# Patient Record
Sex: FEMALE | Race: WHITE | NOT HISPANIC OR LATINO | Employment: OTHER | URBAN - METROPOLITAN AREA
[De-identification: names, ages, dates, MRNs, and addresses within clinical notes are randomized per-mention and may not be internally consistent; named-entity substitution may affect disease eponyms.]

---

## 2017-01-30 RX ORDER — LISINOPRIL 20 MG/1
TABLET ORAL
Qty: 60 TABLET | Refills: 6 | OUTPATIENT
Start: 2017-01-30

## 2017-04-24 PROBLEM — N93.9 ABNORMAL UTERINE BLEEDING (AUB): Status: ACTIVE | Noted: 2017-04-24

## 2017-04-24 PROBLEM — N88.2 CERVICAL STENOSIS (UTERINE CERVIX): Status: ACTIVE | Noted: 2017-04-24

## 2017-05-09 ENCOUNTER — TRANSCRIBE ORDERS (OUTPATIENT)
Dept: GYNECOLOGIC ONCOLOGY | Facility: CLINIC | Age: 62
End: 2017-05-09

## 2017-05-09 DIAGNOSIS — Z12.31 VISIT FOR SCREENING MAMMOGRAM: Primary | ICD-10-CM

## 2017-05-22 ENCOUNTER — TELEPHONE (OUTPATIENT)
Dept: NEUROSURGERY | Facility: CLINIC | Age: 62
End: 2017-05-22

## 2017-06-21 ENCOUNTER — OFFICE VISIT (OUTPATIENT)
Dept: GYNECOLOGIC ONCOLOGY | Facility: CLINIC | Age: 62
End: 2017-06-21

## 2017-06-21 ENCOUNTER — HOSPITAL ENCOUNTER (OUTPATIENT)
Dept: MAMMOGRAPHY | Facility: HOSPITAL | Age: 62
Discharge: HOME | End: 2017-06-21
Attending: OBSTETRICS & GYNECOLOGY | Admitting: OBSTETRICS & GYNECOLOGY

## 2017-06-21 VITALS
DIASTOLIC BLOOD PRESSURE: 65 MMHG | WEIGHT: 185 LBS | BODY MASS INDEX: 34.04 KG/M2 | RESPIRATION RATE: 20 BRPM | HEART RATE: 60 BPM | TEMPERATURE: 96.7 F | OXYGEN SATURATION: 95 % | SYSTOLIC BLOOD PRESSURE: 146 MMHG | HEIGHT: 62 IN

## 2017-06-21 DIAGNOSIS — Z12.31 VISIT FOR SCREENING MAMMOGRAM: ICD-10-CM

## 2017-06-21 DIAGNOSIS — E66.09 NON MORBID OBESITY DUE TO EXCESS CALORIES: ICD-10-CM

## 2017-06-21 DIAGNOSIS — N93.9 ABNORMAL UTERINE BLEEDING (AUB): ICD-10-CM

## 2017-06-21 DIAGNOSIS — Z01.419 WELL FEMALE EXAM WITH ROUTINE GYNECOLOGICAL EXAM: Primary | ICD-10-CM

## 2017-06-21 PROCEDURE — G0202 SCR MAMMO BI INCL CAD: HCPCS

## 2017-06-21 PROCEDURE — 77063 BREAST TOMOSYNTHESIS BI: CPT | Performed by: RADIOLOGY

## 2017-06-21 PROCEDURE — 99396 PREV VISIT EST AGE 40-64: CPT | Performed by: NURSE PRACTITIONER

## 2017-06-21 PROCEDURE — 77067 SCR MAMMO BI INCL CAD: CPT | Performed by: RADIOLOGY

## 2017-06-21 PROCEDURE — 77063 BREAST TOMOSYNTHESIS BI: CPT

## 2017-06-21 NOTE — PROGRESS NOTES
GYN ONCOLOGY ANNUAL WELL WOMAN VISIT      Stella Edmonds  [id number]  1955      Chief Complaint: Gynecologic Exam (here for annual exam with no complaints. )        History of present illness:  Stella Edmonds is a 62 y.o. year old female who is here today for an annual exam.  Patient has a history of fibroid uterus and abnormal bleeding and is status post hysterectomy and bilateral salpingo-nephrectomy.  She is feeling well today.  She has had no significant changes in health history her recent surgeries.  She is getting ready to retire within the next 6 months.  She is concerned about her difficulty with weight loss.  She has cut out so that has increased activity but has not been very successful with overall weight loss.  Her goal is to be able to come off some of her blood pressure medications as well as get healthier in her [facility].  Otherwise she has been feeling well.  She's had no vaginal bleeding or pelvic pain.  Her bowels and bladder are functioning well.  Her mammogram was done this morning and she has no new breast complaints today.  Her colonoscopy is up-to-date, done 2016 recommendations for 10 year follow-up.  She sees Dr. TARI Blanco for primary care for management of thyroid dysfunction and hypertension.    Cancer History:    No history exists.       Obstetric History:  OB History      Para Term  AB TAB SAB Ectopic Multiple Living    1 1 1                Menstrual History:     No LMP recorded. Patient has had a hysterectomy.          Past Medical History:   Diagnosis Date   • Abnormal EKG     History of abnormal EKG: Myocardial perfusion study, 2009:  Reduced exercise tolerance, no evidence of inducible ischemia, normal LV systolic function.    • Abnormal leg movement     Probable restless leg syndrome.   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    • Esophageal dysmotility    • Fatigue      She reports  that she is feeling less fatigued off her diuretic. Lightheadedness and dizziness; improved.   • Gastritis    • GERD (gastroesophageal reflux disease)    • Globus sensation    •  1 para 1    • Hiatal hernia    • History of hepatitis A     Age 9   • Hot flashes    • Hyperlipidemia    • Hypertension    • Hypothyroidism    • Lower extremity edema     Mild.She did state that she does have some increase in her lower extremity edema, and Dr. Willett discussed with her that she may take hydrochlorothiazide 12.5 or half a tablet on an as needed basis.   • Migraines     Migraines, treated with propranolol.   • Obesity (BMI 30.0-34.9)     BMI 33.5   • Reflux esophagitis    • Schatzki's ring    • UTI (urinary tract infection)     History of UTIs   • Varicose vein     Varicosities       Past Surgical History:   Procedure Laterality Date   • BREAST BIOPSY Left        • ESOPHAGEAL DILATATION     • HYSTERECTOMY      total    • OOPHORECTOMY     • OTHER SURGICAL HISTORY  2015    Prior right frontal craniotomy for Spetzler-Chucky grade I arteriovenous malformation.-DR. AU/DR. BUNDY   • OTHER SURGICAL HISTORY      Frontal AV malformation with removal, Dr. Au, .   • RENAL ARTERY STENT     • ROTATOR CUFF REPAIR Left     Left rotator cuff surgery.   • UPPER GASTROINTESTINAL ENDOSCOPY  2005       MEDICATIONS: The current medication list was reviewed and reconciled.     Allergies:  is allergic to codeine; lortab [hydrocodone-acetaminophen]; morphine and related; and other.    Family History   Problem Relation Age of Onset   • Breast cancer Mother 34   • Cervical cancer Other    • Ovarian cancer Paternal Grandmother 80       Health Maintenance:  Last mammogram was 2017. Last colonoscopy was 2016, with recommended follow-up in 10 year(s). Last pap smear was 2014, results were  normal PAP..    Review of Systems   Constitutional: Positive for fatigue. Negative for fever and  "unexpected weight change.   HENT: Negative for congestion, ear pain, hearing loss, sinus pressure and trouble swallowing.    Eyes: Negative for visual disturbance.   Respiratory: Negative for cough, chest tightness, shortness of breath and wheezing.    Cardiovascular: Negative for chest pain, palpitations and leg swelling.   Gastrointestinal: Negative for abdominal distention, abdominal pain, constipation, diarrhea, nausea and vomiting.   Endocrine: Negative for cold intolerance, heat intolerance, polydipsia, polyphagia and polyuria.   Genitourinary: Negative for difficulty urinating, dyspareunia, dysuria, frequency, hematuria, pelvic pain, urgency, vaginal bleeding, vaginal discharge and vaginal pain.   Musculoskeletal: Positive for arthralgias. Negative for gait problem, joint swelling and myalgias.   Skin: Negative for color change, pallor and rash.   Neurological: Negative for dizziness, seizures, syncope, weakness, light-headedness, numbness and headaches.   Hematological: Negative for adenopathy. Does not bruise/bleed easily.   Psychiatric/Behavioral: Negative for agitation, confusion, sleep disturbance and suicidal ideas. The patient is not nervous/anxious.        Physical Exam  Vital Signs: /65  Pulse 60  Temp 96.7 °F (35.9 °C) (Temporal Artery )   Resp 20  Ht 62\" (157.5 cm)  Wt 185 lb (83.9 kg)  SpO2 95%  BMI 33.84 kg/m2   General Appearance:  alert, cooperative, no apparent distress and appears stated age   Neurologic/Psychiatric: A&O x 3, gait steady, appropriate affect   HEENT:  Normocephalic, without obvious abnormality, mucous membranes moist   Neck: Supple, symmetrical, trachea midline, no adenopathy;  No thyromegaly, masses, or tenderness   Back:   Symmetric, no curvature, ROM normal, no CVA tenderness   Lungs:   Clear to auscultation bilaterally; respirations regular, even, and unlabored bilaterally   Heart:  Regular rate and rhythm, no murmurs appreciated   Breasts:  Symmetrical, no " masses, no lesions and no nipple discharge   Abdomen:   Soft, non-tender, non-distended and no organomegaly   Lymph nodes: No cervical, supraclavicular, inguinal or axillary adenopathy noted   Extremities: Normal, atraumatic; no clubbing, cyanosis, or edema    Skin: No rashes, ulcers, or suspicious lesions noted   Pelvic: External Genitalia  without lesions or skin changes  Vagina  is pale, atrophic.   Vaginal Cuff  Female Vaginal Cuff: smooth, intact, without visible lesions and pap obtained  Uterus  surgically absent  Ovaries  surgically absent bilaterallly and without palpable masses or fullness  Parametria  smooth  Rectovaginal  Female rectovaginal: confirms no masses or bleeding and Hemoccult negative     ECOG Performance Status: 1 - Symptomatic but completely ambulatory    Procedure Note:  No notes on file    Assessment and Plan:    Stella was seen today for gynecologic exam.    Diagnoses and all orders for this visit:    Well female exam with routine gynecological exam    Abnormal uterine bleeding (AUB)    Non morbid obesity due to excess calories  -     Ambulatory Referral to Nutrition Services      The patient was instructed to call with vaginal bleeding, discharge, pelvic pain, change in bowel or bladder function, or any new symptoms for evaluation of her complaints.   Mamm UTD- next due 6/21/2017  Colonoscopy UTD- next due 2026 with Dr. Jeffers  I will refer her to dietician to call her regarding recommendations for weight loss. The patient has already made some changes including cutting out all intake of polyp and increasing her exercise with walking.  She would like to become off her blood pressure medication and knows needs to lose weight to do so.  Primary care follow up with Dr. Blanco for management of hypothyroidism and hypertension.   Return in about 1 year (around 6/21/2018) for Annual Exam.      ZOHAIB Whitaker      Note: Speech recognition transcription software was used to dictate  portions of this document.  An attempt at proofreading has been made though minor errors in transcription may still be present.  Please do not hesitate to call our office with any questions.

## 2017-06-27 ENCOUNTER — HOSPITAL ENCOUNTER (OUTPATIENT)
Dept: MAMMOGRAPHY | Facility: HOSPITAL | Age: 62
Discharge: HOME | End: 2017-06-27
Admitting: OBSTETRICS & GYNECOLOGY

## 2017-06-27 ENCOUNTER — TRANSCRIBE ORDERS (OUTPATIENT)
Dept: MAMMOGRAPHY | Facility: HOSPITAL | Age: 62
End: 2017-06-27

## 2017-06-27 DIAGNOSIS — R92.8 ABNORMAL MAMMOGRAM: Primary | ICD-10-CM

## 2017-06-27 DIAGNOSIS — R92.8 ABNORMAL MAMMOGRAM: ICD-10-CM

## 2017-06-27 PROCEDURE — G0204 DX MAMMO INCL CAD BI: HCPCS

## 2017-06-27 PROCEDURE — 77066 DX MAMMO INCL CAD BI: CPT | Performed by: RADIOLOGY

## 2017-07-10 ENCOUNTER — HOSPITAL ENCOUNTER (OUTPATIENT)
Dept: MAMMOGRAPHY | Facility: HOSPITAL | Age: 62
Discharge: HOME | End: 2017-07-10
Attending: OBSTETRICS & GYNECOLOGY | Admitting: OBSTETRICS & GYNECOLOGY

## 2017-07-10 ENCOUNTER — HOSPITAL ENCOUNTER (OUTPATIENT)
Dept: MAMMOGRAPHY | Facility: HOSPITAL | Age: 62
Discharge: HOME | End: 2017-07-10

## 2017-07-10 ENCOUNTER — TRANSCRIBE ORDERS (OUTPATIENT)
Dept: MAMMOGRAPHY | Facility: HOSPITAL | Age: 62
End: 2017-07-10

## 2017-07-10 DIAGNOSIS — R92.8 ABNORMAL MAMMOGRAM: Primary | ICD-10-CM

## 2017-07-10 DIAGNOSIS — R92.8 ABNORMAL MAMMOGRAM: ICD-10-CM

## 2017-07-10 PROCEDURE — 88305 TISSUE EXAM BY PATHOLOGIST: CPT | Performed by: RADIOLOGY

## 2017-07-10 PROCEDURE — 77065 DX MAMMO INCL CAD UNI: CPT | Performed by: RADIOLOGY

## 2017-07-10 PROCEDURE — 76098 X-RAY EXAM SURGICAL SPECIMEN: CPT

## 2017-07-10 PROCEDURE — 19081 BX BREAST 1ST LESION STRTCTC: CPT | Performed by: RADIOLOGY

## 2017-07-10 RX ORDER — LIDOCAINE HYDROCHLORIDE AND EPINEPHRINE 10; 10 MG/ML; UG/ML
10 INJECTION, SOLUTION INFILTRATION; PERINEURAL ONCE
Status: COMPLETED | OUTPATIENT
Start: 2017-07-10 | End: 2017-07-10

## 2017-07-10 RX ORDER — LIDOCAINE HYDROCHLORIDE 10 MG/ML
5 INJECTION, SOLUTION INFILTRATION; PERINEURAL ONCE
Status: COMPLETED | OUTPATIENT
Start: 2017-07-10 | End: 2017-07-10

## 2017-07-10 RX ADMIN — LIDOCAINE HYDROCHLORIDE,EPINEPHRINE BITARTRATE 10 ML: 10; .01 INJECTION, SOLUTION INFILTRATION; PERINEURAL at 12:34

## 2017-07-10 RX ADMIN — LIDOCAINE HYDROCHLORIDE 5 ML: 10 INJECTION, SOLUTION INFILTRATION; PERINEURAL at 12:34

## 2017-07-11 LAB
CYTO UR: NORMAL
LAB AP CASE REPORT: NORMAL
LAB AP CLINICAL INFORMATION: NORMAL
LAB AP DIAGNOSIS COMMENT: NORMAL
Lab: NORMAL
PATH REPORT.FINAL DX SPEC: NORMAL
PATH REPORT.GROSS SPEC: NORMAL

## 2017-07-12 DIAGNOSIS — Z80.3 FAMILY HISTORY OF BREAST CANCER: Primary | ICD-10-CM

## 2017-07-20 ENCOUNTER — TELEPHONE (OUTPATIENT)
Dept: GENETICS | Facility: HOSPITAL | Age: 62
End: 2017-07-20

## 2017-09-16 ENCOUNTER — HOSPITAL ENCOUNTER (EMERGENCY)
Facility: HOSPITAL | Age: 62
Discharge: HOME | End: 2017-09-16
Attending: EMERGENCY MEDICINE | Admitting: EMERGENCY MEDICINE

## 2017-09-16 VITALS
WEIGHT: 180 LBS | DIASTOLIC BLOOD PRESSURE: 93 MMHG | BODY MASS INDEX: 33.13 KG/M2 | RESPIRATION RATE: 18 BRPM | TEMPERATURE: 98.1 F | HEIGHT: 62 IN | HEART RATE: 75 BPM | SYSTOLIC BLOOD PRESSURE: 140 MMHG | OXYGEN SATURATION: 98 %

## 2017-09-16 DIAGNOSIS — M54.32 SCIATICA OF LEFT SIDE: Primary | ICD-10-CM

## 2017-09-16 PROCEDURE — 96372 THER/PROPH/DIAG INJ SC/IM: CPT

## 2017-09-16 PROCEDURE — 99283 EMERGENCY DEPT VISIT LOW MDM: CPT

## 2017-09-16 PROCEDURE — 25010000002 KETOROLAC TROMETHAMINE PER 15 MG: Performed by: PHYSICIAN ASSISTANT

## 2017-09-16 RX ORDER — KETOROLAC TROMETHAMINE 30 MG/ML
30 INJECTION, SOLUTION INTRAMUSCULAR; INTRAVENOUS ONCE
Status: COMPLETED | OUTPATIENT
Start: 2017-09-16 | End: 2017-09-16

## 2017-09-16 RX ORDER — KETOROLAC TROMETHAMINE 30 MG/ML
30 INJECTION, SOLUTION INTRAMUSCULAR; INTRAVENOUS ONCE
Status: DISCONTINUED | OUTPATIENT
Start: 2017-09-16 | End: 2017-09-16

## 2017-09-16 RX ORDER — PREDNISONE 20 MG/1
TABLET ORAL
Qty: 18 TABLET | Refills: 0 | Status: SHIPPED | OUTPATIENT
Start: 2017-09-16 | End: 2017-09-28

## 2017-09-16 RX ADMIN — KETOROLAC TROMETHAMINE 30 MG: 30 INJECTION, SOLUTION INTRAMUSCULAR at 09:18

## 2017-09-16 NOTE — ED PROVIDER NOTES
Subjective   Patient is a 62 y.o. female presenting with back pain.   History provided by:  Patient   used: No    Back Pain   Location:  Lumbar spine  Quality:  Burning, cramping and shooting  Radiates to:  L posterior upper leg  Pain severity:  Severe  Pain is:  Same all the time  Onset quality:  Gradual  Duration:  3 weeks  Timing:  Constant  Progression:  Worsening  Chronicity:  New  Context comment:  Patient has been seen by her PMD is had an MRI of the left hip which was normal scheduled for an MRI of the lumbar spine on Monday.  Relieved by: toradol.  Worsened by:  Sneezing and bending  Ineffective treatments:  None tried  Associated symptoms: leg pain    Associated symptoms: no abdominal pain, no bladder incontinence, no bowel incontinence, no dysuria, no fever, no numbness, no paresthesias, no perianal numbness and no weakness        Review of Systems   Constitutional: Negative for chills and fever.   HENT: Negative for rhinorrhea and sore throat.    Gastrointestinal: Negative for abdominal pain, bowel incontinence, diarrhea, nausea and vomiting.   Genitourinary: Negative for bladder incontinence, dysuria, frequency and hematuria.   Musculoskeletal: Positive for back pain.   Neurological: Negative for weakness, numbness and paresthesias.   Psychiatric/Behavioral: Negative.    All other systems reviewed and are negative.      Past Medical History:   Diagnosis Date   • Abnormal EKG     History of abnormal EKG: Myocardial perfusion study, 06/23/2009:  Reduced exercise tolerance, no evidence of inducible ischemia, normal LV systolic function.    • Abnormal leg movement     Probable restless leg syndrome.   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    • Esophageal dysmotility    • Fatigue      She reports that she is feeling less fatigued off her diuretic. Lightheadedness and dizziness; improved.   • Gastritis    • GERD (gastroesophageal  reflux disease)    • Globus sensation    •  1 para 1    • Hiatal hernia    • History of hepatitis A     Age 9   • Hot flashes    • Hyperlipidemia    • Hypertension    • Hypothyroidism    • Lower extremity edema     Mild.She did state that she does have some increase in her lower extremity edema, and Dr. Willett discussed with her that she may take hydrochlorothiazide 12.5 or half a tablet on an as needed basis.   • Migraines     Migraines, treated with propranolol.   • Obesity (BMI 30.0-34.9)     BMI 33.5   • Reflux esophagitis    • Schatzki's ring    • UTI (urinary tract infection)     History of UTIs   • Varicose vein     Varicosities       Allergies   Allergen Reactions   • Codeine    • Lortab [Hydrocodone-Acetaminophen]    • Morphine And Related    • Other      History of possible seizures, patient felt related to orange dye.   Most pain meds lead to GI upset/vomiting.  Norman Dye       Past Surgical History:   Procedure Laterality Date   • BREAST BIOPSY Left     US   • ESOPHAGEAL DILATATION     • HYSTERECTOMY      total    • OOPHORECTOMY     • OTHER SURGICAL HISTORY  2015    Prior right frontal craniotomy for Spetzler-Chucky grade I arteriovenous malformation.-DR. AU/DR. BUNDY   • OTHER SURGICAL HISTORY      Frontal AV malformation with removal, Dr. Au, 2015.   • RENAL ARTERY STENT     • ROTATOR CUFF REPAIR Left     Left rotator cuff surgery.   • UPPER GASTROINTESTINAL ENDOSCOPY  2005       Family History   Problem Relation Age of Onset   • Breast cancer Mother 34   • Cervical cancer Other    • Ovarian cancer Paternal Grandmother 80       Social History     Social History   • Marital status:      Spouse name: N/A   • Number of children: N/A   • Years of education: N/A     Social History Main Topics   • Smoking status: Never Smoker   • Smokeless tobacco: None   • Alcohol use No   • Drug use: No   • Sexual activity: No     Other Topics Concern   • None      Social History Narrative   • None           Objective   Physical Exam   Constitutional: She is oriented to person, place, and time. She appears well-developed and well-nourished.   HENT:   Head: Normocephalic and atraumatic.   Right Ear: External ear normal.   Left Ear: External ear normal.   Nose: Nose normal.   Mouth/Throat: Oropharynx is clear and moist.   Eyes: Conjunctivae and EOM are normal. Pupils are equal, round, and reactive to light. No scleral icterus.   Neck: Normal range of motion. No thyromegaly present.   Cardiovascular: Normal rate, regular rhythm and normal heart sounds.    Pulmonary/Chest: Effort normal and breath sounds normal. No respiratory distress. She has no wheezes. She has no rales. She exhibits no tenderness.   Abdominal: Soft. Bowel sounds are normal. She exhibits no distension. There is no tenderness.   Musculoskeletal:   Pt tender over the SI joint.    Lymphadenopathy:     She has no cervical adenopathy.   Neurological: She is alert and oriented to person, place, and time. She has normal reflexes. She displays normal reflexes. No cranial nerve deficit. Coordination normal.   Skin: Skin is warm and dry.   Psychiatric: She has a normal mood and affect. Her behavior is normal. Judgment and thought content normal.   Nursing note and vitals reviewed.      Procedures         ED Course  ED Course                  MDM  Number of Diagnoses or Management Options  new and requires workup     Amount and/or Complexity of Data Reviewed  Discuss the patient with other providers: yes    Patient Progress  Patient progress: stable      Final diagnoses:   Sciatica of left side            EDUARDO Baker  09/16/17 2481

## 2017-09-27 RX ORDER — CHLORZOXAZONE 500 MG/1
500 TABLET ORAL 4 TIMES DAILY PRN
COMMUNITY
End: 2017-10-03

## 2017-09-27 RX ORDER — CYCLOBENZAPRINE HCL 10 MG
10 TABLET ORAL 3 TIMES DAILY PRN
COMMUNITY
End: 2017-10-03

## 2017-09-27 RX ORDER — MELOXICAM 15 MG/1
15 TABLET ORAL DAILY
COMMUNITY
End: 2017-10-03

## 2017-09-27 RX ORDER — BACLOFEN 10 MG/1
10 TABLET ORAL 3 TIMES DAILY
COMMUNITY
End: 2017-10-03

## 2017-09-27 RX ORDER — PANTOPRAZOLE SODIUM 40 MG/1
40 TABLET, DELAYED RELEASE ORAL DAILY
Status: ON HOLD | COMMUNITY
End: 2021-11-08

## 2017-09-27 RX ORDER — MECLIZINE HYDROCHLORIDE 25 MG/1
25 TABLET ORAL 3 TIMES DAILY PRN
COMMUNITY
End: 2017-10-03

## 2017-09-27 RX ORDER — TRAMADOL HYDROCHLORIDE 50 MG/1
50 TABLET ORAL EVERY 6 HOURS PRN
COMMUNITY
End: 2021-03-03

## 2017-09-27 RX ORDER — BENZONATATE 200 MG/1
200 CAPSULE ORAL 3 TIMES DAILY PRN
COMMUNITY
End: 2017-10-03

## 2017-09-27 RX ORDER — LIDOCAINE 50 MG/G
1 PATCH TOPICAL EVERY 24 HOURS
COMMUNITY
End: 2017-10-03

## 2017-09-28 ENCOUNTER — OFFICE VISIT (OUTPATIENT)
Dept: NEUROSURGERY | Facility: CLINIC | Age: 62
End: 2017-09-28

## 2017-09-28 ENCOUNTER — PREP FOR SURGERY (OUTPATIENT)
Dept: OTHER | Facility: HOSPITAL | Age: 62
End: 2017-09-28

## 2017-09-28 VITALS — BODY MASS INDEX: 33.13 KG/M2 | WEIGHT: 180 LBS | TEMPERATURE: 97.7 F | HEIGHT: 62 IN

## 2017-09-28 DIAGNOSIS — M51.26 HERNIATED LUMBAR DISC WITHOUT MYELOPATHY: Primary | ICD-10-CM

## 2017-09-28 DIAGNOSIS — M54.16 LUMBAR RADICULOPATHY: Primary | ICD-10-CM

## 2017-09-28 PROCEDURE — 99214 OFFICE O/P EST MOD 30 MIN: CPT | Performed by: NEUROLOGICAL SURGERY

## 2017-09-28 RX ORDER — FAMOTIDINE 20 MG/1
20 TABLET, FILM COATED ORAL
Status: CANCELLED | OUTPATIENT
Start: 2017-09-28

## 2017-09-28 RX ORDER — IBUPROFEN 800 MG/1
800 TABLET ORAL ONCE
Status: CANCELLED | OUTPATIENT
Start: 2017-09-28 | End: 2017-09-28

## 2017-09-28 RX ORDER — CEFAZOLIN SODIUM 2 G/100ML
2 INJECTION, SOLUTION INTRAVENOUS ONCE
Status: CANCELLED | OUTPATIENT
Start: 2017-09-28 | End: 2017-09-28

## 2017-09-28 RX ORDER — CHLORHEXIDINE GLUCONATE 4 G/100ML
SOLUTION TOPICAL
Qty: 120 ML | Refills: 0 | Status: SHIPPED | OUTPATIENT
Start: 2017-09-28 | End: 2017-10-03

## 2017-09-28 RX ORDER — HYDROCODONE BITARTRATE AND ACETAMINOPHEN 7.5; 325 MG/1; MG/1
1 TABLET ORAL ONCE
Status: CANCELLED | OUTPATIENT
Start: 2017-09-28 | End: 2017-09-28

## 2017-09-28 RX ORDER — ACETAMINOPHEN 325 MG/1
650 TABLET ORAL ONCE
Status: CANCELLED | OUTPATIENT
Start: 2017-09-28 | End: 2017-09-28

## 2017-09-28 NOTE — PROGRESS NOTES
NAME: JAVIER ECHAVARRIA   DOS: 2017  : 1955  PCP: Ethan Blanco MD    Chief Complaint:  Back and left leg pain  Chief Complaint   Patient presents with   • Lower back pain       History of Present Illness:  62 y.o. female   Is a 62-year-old-year-old female well-known to me she presented initially with a right-sided frontal arteriovenous malformation that underwent on complicated surgical extirpation and had a history of seizures.  She has a history of also neck pain that I saw her for and has some cervical type of migraines.  She's been doing very well in the last number of years but most recently about 5 weeks ago experienced acute onset of back buttock hip and left leg pain.  She denies any weakness or symptoms of cauda equina syndrome has been through physical therapy steroids and is miserable.    PMHX  Allergies:  Allergies   Allergen Reactions   • Codeine    • Lortab [Hydrocodone-Acetaminophen]    • Morphine And Related    • Other      History of possible seizures, patient felt related to orange dye.   Most pain meds lead to GI upset/vomiting.  Orange Dye     Medications    Current Outpatient Prescriptions:   •  baclofen (LIORESAL) 10 MG tablet, Take 10 mg by mouth 3 (Three) Times a Day., Disp: , Rfl:   •  benzonatate (TESSALON) 200 MG capsule, Take 200 mg by mouth 3 (Three) Times a Day As Needed for Cough., Disp: , Rfl:   •  chlorzoxazone (PARAFON FORTE) 500 MG tablet, Take 500 mg by mouth 4 (Four) Times a Day As Needed for Muscle Spasms., Disp: , Rfl:   •  cyclobenzaprine (FLEXERIL) 10 MG tablet, Take 10 mg by mouth 3 (Three) Times a Day As Needed for Muscle Spasms., Disp: , Rfl:   •  diclofenac (FLECTOR) 1.3 % patch patch, Apply 1 patch topically 2 (Two) Times a Day., Disp: , Rfl:   •  levothyroxine (SYNTHROID, LEVOTHROID) 75 MCG tablet, Take 75 mcg by mouth daily., Disp: , Rfl:   •  lidocaine (LIDODERM) 5 %, Place 1 patch on the skin Daily. Remove & Discard patch within 12 hours or as  directed by MD, Disp: , Rfl:   •  lisinopril (PRINIVIL,ZESTRIL) 20 MG tablet, Take 20 mg by mouth Daily., Disp: , Rfl:   •  meclizine (ANTIVERT) 25 MG tablet, Take 25 mg by mouth 3 (Three) Times a Day As Needed for dizziness., Disp: , Rfl:   •  meloxicam (MOBIC) 15 MG tablet, Take 15 mg by mouth Daily., Disp: , Rfl:   •  pantoprazole (PROTONIX) 40 MG EC tablet, Take 40 mg by mouth Daily., Disp: , Rfl:   •  pravastatin (PRAVACHOL) 20 MG tablet, Take 20 mg by mouth daily., Disp: , Rfl:   •  propranolol (INDERAL) 20 MG tablet, Take 10 mg by mouth Daily., Disp: , Rfl:   •  traMADol (ULTRAM) 50 MG tablet, Take 50 mg by mouth Every 6 (Six) Hours As Needed for Moderate Pain ., Disp: , Rfl:   •  Zolpidem Tartrate (AMBIEN PO), Take  by mouth., Disp: , Rfl:   Past Medical History:  Past Medical History:   Diagnosis Date   • Abnormal EKG     History of abnormal EKG: Myocardial perfusion study, 2009:  Reduced exercise tolerance, no evidence of inducible ischemia, normal LV systolic function.    • Abnormal leg movement     Probable restless leg syndrome.   • AVM (arteriovenous malformation)     Frontal AV malformation with removal by Dr. Sarmiento, .    • Esophageal dysmotility    • Fatigue      She reports that she is feeling less fatigued off her diuretic. Lightheadedness and dizziness; improved.   • Gastritis    • GERD (gastroesophageal reflux disease)    • Globus sensation    •  1 para 1    • Hiatal hernia    • History of hepatitis A     Age 9   • Hot flashes    • Hyperlipidemia    • Hypertension    • Hypothyroidism    • Lower extremity edema     Mild.She did state that she does have some increase in her lower extremity edema, and Dr. Willett discussed with her that she may take hydrochlorothiazide 12.5 or half a tablet on an as needed basis.   • Migraines     Migraines, treated with propranolol.   • Obesity (BMI 30.0-34.9)     BMI 33.5   • Reflux esophagitis    • Schatzki's ring    • UTI (urinary tract  infection)     History of UTIs   • Varicose vein     Varicosities     Past Surgical History:  Past Surgical History:   Procedure Laterality Date   • BREAST BIOPSY Left 2000    US   • ESOPHAGEAL DILATATION     • HYSTERECTOMY      total 1997   • OOPHORECTOMY  1997   • OTHER SURGICAL HISTORY  08/07/2015    Prior right frontal craniotomy for Spetzler-Chucky grade I arteriovenous malformation.-DR. AU/DR. BUNDY   • OTHER SURGICAL HISTORY  2015    Frontal AV malformation with removal, Dr. Au, 2015.   • RENAL ARTERY STENT     • ROTATOR CUFF REPAIR Left     Left rotator cuff surgery.   • UPPER GASTROINTESTINAL ENDOSCOPY  11/02/2005     Social Hx:  Social History   Substance Use Topics   • Smoking status: Never Smoker   • Smokeless tobacco: None   • Alcohol use No     Family Hx:  Family History   Problem Relation Age of Onset   • Breast cancer Mother 34   • Arthritis Mother    • Cervical cancer Other    • Ovarian cancer Paternal Grandmother 80   • Arthritis Father      Review of Systems:        Review of Systems   Constitutional: Negative for activity change, appetite change, chills, diaphoresis, fatigue, fever and unexpected weight change.   HENT: Negative for congestion, dental problem, drooling, ear discharge, ear pain, facial swelling, hearing loss, mouth sores, nosebleeds, postnasal drip, rhinorrhea, sinus pressure, sneezing, sore throat, tinnitus, trouble swallowing and voice change.    Eyes: Negative for photophobia, pain, discharge, redness, itching and visual disturbance.   Respiratory: Negative for apnea, cough, choking, chest tightness, shortness of breath, wheezing and stridor.    Cardiovascular: Negative for chest pain, palpitations and leg swelling.   Gastrointestinal: Negative for abdominal distention, abdominal pain, anal bleeding, blood in stool, constipation, diarrhea, nausea, rectal pain and vomiting.   Endocrine: Negative for cold intolerance, heat intolerance, polydipsia, polyphagia and polyuria.    Genitourinary: Negative for decreased urine volume, difficulty urinating, dysuria, enuresis, flank pain, frequency, genital sores, hematuria and urgency.   Musculoskeletal: Positive for myalgias. Negative for arthralgias, back pain, gait problem, joint swelling, neck pain and neck stiffness.   Skin: Negative for color change, pallor, rash and wound.   Allergic/Immunologic: Positive for food allergies. Negative for environmental allergies and immunocompromised state.   Neurological: Positive for numbness and headaches. Negative for dizziness, tremors, seizures, syncope, facial asymmetry, speech difficulty, weakness and light-headedness.   Hematological: Negative for adenopathy. Does not bruise/bleed easily.   Psychiatric/Behavioral: Negative for agitation, behavioral problems, confusion, decreased concentration, dysphoric mood, hallucinations, self-injury, sleep disturbance and suicidal ideas. The patient is not nervous/anxious and is not hyperactive.    All other systems reviewed and are negative.       I reviewed the patient's past medical history and review of systems family history social history etc. per the medical record    Physical Examination:  Vitals:    09/28/17 1222   Temp: 97.7 °F (36.5 °C)      General Appearance:   Well developed, well nourished, well groomed, alert, and cooperative.  Neurological examination:  Neurologic Exam  Vital signs were reviewed and documented in the chart  Patient appeared in good neurologic function with normal comprehension fluent speech  Mood and affect are normal  Sense of smell deferred  Cranial incisions well-healed  Pupils symmetric equally reactive funduscopic exam not visualized   Visual fields intact to confrontation  Extraocular movements intact  Face motor function is symmetric  Facial sensations normal  Hearing intact to finger rub hearing intact to finger rub  Tongue is midline  Palate symmetric  Swallowing normal  Shoulder shrug normal    Muscle bulk and tone  normal  5 out of 5 strength no motor drift  Gait normal intact  Negative Romberg  No clonus long tract signs or myelopathy    Reflexes symmetric intact with the exception of the left ankle  No edema noted and extremities skin appears normal    Straight leg raise area significant on the left  No signs of intrinsic hip dysfunction  Back is without any lesions or abnormality  Feet are warm and well perfused        Review of Imaging/DATA:  Disc herniation left L5-S1 with compression of the left exiting S1 nerve root  Diagnoses/Plan:    Ms. Edmonds is a 62 y.o. female   She presents with left-sided radicular complaints referrable to sciatica and S1 distribution.  She has decreased ankle reflex on that side straight leg raise sign and has been recalcitrant and miserable she states she hasn't slept in the last week secondary to her pain.  I discussed the treatment options and surgery is the preferred route.  I explained a lumbar microdiscectomy the risks benefits and expected outcome and we will make arrangements accordingly a left-sided L5-S1 microdiscectomy

## 2017-09-29 PROBLEM — M54.16 LUMBAR RADICULOPATHY: Status: ACTIVE | Noted: 2017-09-29

## 2017-10-02 ENCOUNTER — APPOINTMENT (OUTPATIENT)
Dept: PREADMISSION TESTING | Facility: HOSPITAL | Age: 62
End: 2017-10-02

## 2017-10-03 ENCOUNTER — APPOINTMENT (OUTPATIENT)
Dept: PREADMISSION TESTING | Facility: HOSPITAL | Age: 62
End: 2017-10-03

## 2017-10-03 VITALS — HEIGHT: 62 IN | WEIGHT: 182.32 LBS | BODY MASS INDEX: 33.55 KG/M2

## 2017-10-03 DIAGNOSIS — M54.16 LUMBAR RADICULOPATHY: ICD-10-CM

## 2017-10-03 LAB
ANION GAP SERPL CALCULATED.3IONS-SCNC: 3 MMOL/L (ref 3–11)
BASOPHILS # BLD AUTO: 0.04 10*3/MM3 (ref 0–0.2)
BASOPHILS NFR BLD AUTO: 0.8 % (ref 0–1)
BUN BLD-MCNC: 23 MG/DL (ref 9–23)
BUN/CREAT SERPL: 28.8 (ref 7–25)
CALCIUM SPEC-SCNC: 9.7 MG/DL (ref 8.7–10.4)
CHLORIDE SERPL-SCNC: 103 MMOL/L (ref 99–109)
CO2 SERPL-SCNC: 31 MMOL/L (ref 20–31)
CREAT BLD-MCNC: 0.8 MG/DL (ref 0.6–1.3)
DEPRECATED RDW RBC AUTO: 45.2 FL (ref 37–54)
EOSINOPHIL # BLD AUTO: 0.33 10*3/MM3 (ref 0–0.3)
EOSINOPHIL NFR BLD AUTO: 6.2 % (ref 0–3)
ERYTHROCYTE [DISTWIDTH] IN BLOOD BY AUTOMATED COUNT: 13.1 % (ref 11.3–14.5)
GFR SERPL CREATININE-BSD FRML MDRD: 73 ML/MIN/1.73
GLUCOSE BLD-MCNC: 93 MG/DL (ref 70–100)
HCT VFR BLD AUTO: 40.7 % (ref 34.5–44)
HGB BLD-MCNC: 13.3 G/DL (ref 11.5–15.5)
IMM GRANULOCYTES # BLD: 0.02 10*3/MM3 (ref 0–0.03)
IMM GRANULOCYTES NFR BLD: 0.4 % (ref 0–0.6)
LYMPHOCYTES # BLD AUTO: 1.1 10*3/MM3 (ref 0.6–4.8)
LYMPHOCYTES NFR BLD AUTO: 20.8 % (ref 24–44)
MCH RBC QN AUTO: 30.6 PG (ref 27–31)
MCHC RBC AUTO-ENTMCNC: 32.7 G/DL (ref 32–36)
MCV RBC AUTO: 93.8 FL (ref 80–99)
MONOCYTES # BLD AUTO: 0.54 10*3/MM3 (ref 0–1)
MONOCYTES NFR BLD AUTO: 10.2 % (ref 0–12)
MRSA DNA SPEC QL NAA+PROBE: NEGATIVE
NEUTROPHILS # BLD AUTO: 3.27 10*3/MM3 (ref 1.5–8.3)
NEUTROPHILS NFR BLD AUTO: 61.6 % (ref 41–71)
PLATELET # BLD AUTO: 273 10*3/MM3 (ref 150–450)
PMV BLD AUTO: 9.4 FL (ref 6–12)
POTASSIUM BLD-SCNC: 3.9 MMOL/L (ref 3.5–5.5)
RBC # BLD AUTO: 4.34 10*6/MM3 (ref 3.89–5.14)
SODIUM BLD-SCNC: 137 MMOL/L (ref 132–146)
WBC NRBC COR # BLD: 5.3 10*3/MM3 (ref 3.5–10.8)

## 2017-10-03 PROCEDURE — 87641 MR-STAPH DNA AMP PROBE: CPT | Performed by: ANESTHESIOLOGY

## 2017-10-03 PROCEDURE — 93005 ELECTROCARDIOGRAM TRACING: CPT

## 2017-10-03 PROCEDURE — 93010 ELECTROCARDIOGRAM REPORT: CPT | Performed by: INTERNAL MEDICINE

## 2017-10-03 PROCEDURE — 36415 COLL VENOUS BLD VENIPUNCTURE: CPT

## 2017-10-03 PROCEDURE — 85025 COMPLETE CBC W/AUTO DIFF WBC: CPT | Performed by: NEUROLOGICAL SURGERY

## 2017-10-03 PROCEDURE — 80048 BASIC METABOLIC PNL TOTAL CA: CPT | Performed by: NEUROLOGICAL SURGERY

## 2017-10-04 ENCOUNTER — APPOINTMENT (OUTPATIENT)
Dept: GENERAL RADIOLOGY | Facility: HOSPITAL | Age: 62
End: 2017-10-04

## 2017-10-04 ENCOUNTER — HOSPITAL ENCOUNTER (OUTPATIENT)
Facility: HOSPITAL | Age: 62
Discharge: HOME | End: 2017-10-04
Attending: NEUROLOGICAL SURGERY | Admitting: NEUROLOGICAL SURGERY

## 2017-10-04 ENCOUNTER — ANESTHESIA EVENT (OUTPATIENT)
Dept: PERIOP | Facility: HOSPITAL | Age: 62
End: 2017-10-04

## 2017-10-04 ENCOUNTER — ANESTHESIA (OUTPATIENT)
Dept: PERIOP | Facility: HOSPITAL | Age: 62
End: 2017-10-04

## 2017-10-04 VITALS
OXYGEN SATURATION: 97 % | SYSTOLIC BLOOD PRESSURE: 155 MMHG | BODY MASS INDEX: 33.49 KG/M2 | WEIGHT: 182 LBS | HEART RATE: 78 BPM | RESPIRATION RATE: 16 BRPM | DIASTOLIC BLOOD PRESSURE: 88 MMHG | HEIGHT: 62 IN | TEMPERATURE: 98 F

## 2017-10-04 DIAGNOSIS — M54.16 LUMBAR RADICULOPATHY: ICD-10-CM

## 2017-10-04 PROCEDURE — 25010000002 NEOSTIGMINE PER 0.5 MG: Performed by: NURSE ANESTHETIST, CERTIFIED REGISTERED

## 2017-10-04 PROCEDURE — 25010000002 PROPOFOL 10 MG/ML EMULSION: Performed by: NURSE ANESTHETIST, CERTIFIED REGISTERED

## 2017-10-04 PROCEDURE — 25010000002 DEXAMETHASONE PER 1 MG: Performed by: NURSE ANESTHETIST, CERTIFIED REGISTERED

## 2017-10-04 PROCEDURE — 76000 FLUOROSCOPY <1 HR PHYS/QHP: CPT

## 2017-10-04 PROCEDURE — 25010000002 FENTANYL CITRATE (PF) 100 MCG/2ML SOLUTION: Performed by: NURSE ANESTHETIST, CERTIFIED REGISTERED

## 2017-10-04 PROCEDURE — 25010000002 ONDANSETRON PER 1 MG: Performed by: NURSE ANESTHETIST, CERTIFIED REGISTERED

## 2017-10-04 PROCEDURE — 63030 LAMOT DCMPRN NRV RT 1 LMBR: CPT | Performed by: NEUROLOGICAL SURGERY

## 2017-10-04 PROCEDURE — 25010000003 CEFAZOLIN IN DEXTROSE 2-4 GM/100ML-% SOLUTION: Performed by: NEUROLOGICAL SURGERY

## 2017-10-04 RX ORDER — FAMOTIDINE 20 MG/1
20 TABLET, FILM COATED ORAL
Status: COMPLETED | OUTPATIENT
Start: 2017-10-04 | End: 2017-10-04

## 2017-10-04 RX ORDER — FENTANYL CITRATE 50 UG/ML
50 INJECTION, SOLUTION INTRAMUSCULAR; INTRAVENOUS
Status: CANCELLED | OUTPATIENT
Start: 2017-10-04

## 2017-10-04 RX ORDER — PROPOFOL 10 MG/ML
VIAL (ML) INTRAVENOUS CONTINUOUS PRN
Status: DISCONTINUED | OUTPATIENT
Start: 2017-10-04 | End: 2017-10-04 | Stop reason: SURG

## 2017-10-04 RX ORDER — LIDOCAINE HYDROCHLORIDE 10 MG/ML
INJECTION, SOLUTION INFILTRATION; PERINEURAL AS NEEDED
Status: DISCONTINUED | OUTPATIENT
Start: 2017-10-04 | End: 2017-10-04 | Stop reason: SURG

## 2017-10-04 RX ORDER — ACETAMINOPHEN 325 MG/1
650 TABLET ORAL ONCE
Status: COMPLETED | OUTPATIENT
Start: 2017-10-04 | End: 2017-10-04

## 2017-10-04 RX ORDER — ESMOLOL HYDROCHLORIDE 10 MG/ML
INJECTION INTRAVENOUS AS NEEDED
Status: DISCONTINUED | OUTPATIENT
Start: 2017-10-04 | End: 2017-10-04 | Stop reason: SURG

## 2017-10-04 RX ORDER — CEFAZOLIN SODIUM 2 G/100ML
2 INJECTION, SOLUTION INTRAVENOUS ONCE
Status: COMPLETED | OUTPATIENT
Start: 2017-10-04 | End: 2017-10-04

## 2017-10-04 RX ORDER — FENTANYL CITRATE 50 UG/ML
INJECTION, SOLUTION INTRAMUSCULAR; INTRAVENOUS AS NEEDED
Status: DISCONTINUED | OUTPATIENT
Start: 2017-10-04 | End: 2017-10-04 | Stop reason: SURG

## 2017-10-04 RX ORDER — EPHEDRINE SULFATE 50 MG/ML
5 INJECTION, SOLUTION INTRAVENOUS ONCE AS NEEDED
Status: CANCELLED | OUTPATIENT
Start: 2017-10-04

## 2017-10-04 RX ORDER — ATRACURIUM BESYLATE 10 MG/ML
INJECTION, SOLUTION INTRAVENOUS AS NEEDED
Status: DISCONTINUED | OUTPATIENT
Start: 2017-10-04 | End: 2017-10-04 | Stop reason: SURG

## 2017-10-04 RX ORDER — ONDANSETRON 2 MG/ML
INJECTION INTRAMUSCULAR; INTRAVENOUS AS NEEDED
Status: DISCONTINUED | OUTPATIENT
Start: 2017-10-04 | End: 2017-10-04 | Stop reason: SURG

## 2017-10-04 RX ORDER — PROPOFOL 10 MG/ML
VIAL (ML) INTRAVENOUS AS NEEDED
Status: DISCONTINUED | OUTPATIENT
Start: 2017-10-04 | End: 2017-10-04 | Stop reason: SURG

## 2017-10-04 RX ORDER — PROMETHAZINE HYDROCHLORIDE 25 MG/1
25 TABLET ORAL ONCE AS NEEDED
Status: CANCELLED | OUTPATIENT
Start: 2017-10-04

## 2017-10-04 RX ORDER — SODIUM CHLORIDE 0.9 % (FLUSH) 0.9 %
1-10 SYRINGE (ML) INJECTION AS NEEDED
Status: DISCONTINUED | OUTPATIENT
Start: 2017-10-04 | End: 2017-10-04 | Stop reason: HOSPADM

## 2017-10-04 RX ORDER — PROMETHAZINE HYDROCHLORIDE 25 MG/1
25 SUPPOSITORY RECTAL ONCE AS NEEDED
Status: CANCELLED | OUTPATIENT
Start: 2017-10-04

## 2017-10-04 RX ORDER — SODIUM CHLORIDE, SODIUM LACTATE, POTASSIUM CHLORIDE, CALCIUM CHLORIDE 600; 310; 30; 20 MG/100ML; MG/100ML; MG/100ML; MG/100ML
9 INJECTION, SOLUTION INTRAVENOUS CONTINUOUS
Status: DISCONTINUED | OUTPATIENT
Start: 2017-10-04 | End: 2017-10-04 | Stop reason: HOSPADM

## 2017-10-04 RX ORDER — PROMETHAZINE HYDROCHLORIDE 25 MG/ML
6.25 INJECTION, SOLUTION INTRAMUSCULAR; INTRAVENOUS ONCE AS NEEDED
Status: CANCELLED | OUTPATIENT
Start: 2017-10-04

## 2017-10-04 RX ORDER — LIDOCAINE HYDROCHLORIDE 10 MG/ML
0.5 INJECTION, SOLUTION EPIDURAL; INFILTRATION; INTRACAUDAL; PERINEURAL ONCE AS NEEDED
Status: COMPLETED | OUTPATIENT
Start: 2017-10-04 | End: 2017-10-04

## 2017-10-04 RX ORDER — GLYCOPYRROLATE 0.2 MG/ML
INJECTION INTRAMUSCULAR; INTRAVENOUS AS NEEDED
Status: DISCONTINUED | OUTPATIENT
Start: 2017-10-04 | End: 2017-10-04 | Stop reason: SURG

## 2017-10-04 RX ORDER — DEXAMETHASONE SODIUM PHOSPHATE 4 MG/ML
INJECTION, SOLUTION INTRA-ARTICULAR; INTRALESIONAL; INTRAMUSCULAR; INTRAVENOUS; SOFT TISSUE AS NEEDED
Status: DISCONTINUED | OUTPATIENT
Start: 2017-10-04 | End: 2017-10-04 | Stop reason: SURG

## 2017-10-04 RX ORDER — IBUPROFEN 800 MG/1
800 TABLET ORAL ONCE
Status: COMPLETED | OUTPATIENT
Start: 2017-10-04 | End: 2017-10-04

## 2017-10-04 RX ORDER — FAMOTIDINE 20 MG/1
20 TABLET, FILM COATED ORAL ONCE
Status: DISCONTINUED | OUTPATIENT
Start: 2017-10-04 | End: 2017-10-04 | Stop reason: SDUPTHER

## 2017-10-04 RX ORDER — MAGNESIUM HYDROXIDE 1200 MG/15ML
LIQUID ORAL AS NEEDED
Status: DISCONTINUED | OUTPATIENT
Start: 2017-10-04 | End: 2017-10-04 | Stop reason: HOSPADM

## 2017-10-04 RX ORDER — BUPIVACAINE HYDROCHLORIDE 2.5 MG/ML
INJECTION, SOLUTION EPIDURAL; INFILTRATION; INTRACAUDAL AS NEEDED
Status: DISCONTINUED | OUTPATIENT
Start: 2017-10-04 | End: 2017-10-04 | Stop reason: HOSPADM

## 2017-10-04 RX ORDER — HYDROCODONE BITARTRATE AND ACETAMINOPHEN 7.5; 325 MG/1; MG/1
1 TABLET ORAL ONCE
Status: DISCONTINUED | OUTPATIENT
Start: 2017-10-04 | End: 2017-10-04 | Stop reason: HOSPADM

## 2017-10-04 RX ORDER — IBUPROFEN 400 MG/1
400 TABLET ORAL EVERY 6 HOURS PRN
COMMUNITY
End: 2021-03-03

## 2017-10-04 RX ORDER — LABETALOL HYDROCHLORIDE 5 MG/ML
INJECTION, SOLUTION INTRAVENOUS AS NEEDED
Status: DISCONTINUED | OUTPATIENT
Start: 2017-10-04 | End: 2017-10-04 | Stop reason: SURG

## 2017-10-04 RX ORDER — LIDOCAINE HYDROCHLORIDE AND EPINEPHRINE 5; 5 MG/ML; UG/ML
INJECTION, SOLUTION INFILTRATION; PERINEURAL AS NEEDED
Status: DISCONTINUED | OUTPATIENT
Start: 2017-10-04 | End: 2017-10-04 | Stop reason: HOSPADM

## 2017-10-04 RX ORDER — FAMOTIDINE 10 MG/ML
20 INJECTION, SOLUTION INTRAVENOUS ONCE
Status: CANCELLED | OUTPATIENT
Start: 2017-10-04 | End: 2017-10-04

## 2017-10-04 RX ORDER — LABETALOL HYDROCHLORIDE 5 MG/ML
5 INJECTION, SOLUTION INTRAVENOUS
Status: CANCELLED | OUTPATIENT
Start: 2017-10-04

## 2017-10-04 RX ADMIN — ATRACURIUM BESYLATE 40 MG: 10 INJECTION, SOLUTION INTRAVENOUS at 11:37

## 2017-10-04 RX ADMIN — PROPOFOL 150 MG: 10 INJECTION, EMULSION INTRAVENOUS at 11:37

## 2017-10-04 RX ADMIN — FAMOTIDINE 20 MG: 20 TABLET ORAL at 08:23

## 2017-10-04 RX ADMIN — GLYCOPYRROLATE 0.4 MG: 0.2 INJECTION, SOLUTION INTRAMUSCULAR; INTRAVENOUS at 12:37

## 2017-10-04 RX ADMIN — LABETALOL HYDROCHLORIDE 5 MG: 5 INJECTION, SOLUTION INTRAVENOUS at 11:49

## 2017-10-04 RX ADMIN — CEFAZOLIN SODIUM 2 G: 2 INJECTION, SOLUTION INTRAVENOUS at 11:32

## 2017-10-04 RX ADMIN — PROPOFOL 25 MCG/KG/MIN: 10 INJECTION, EMULSION INTRAVENOUS at 11:45

## 2017-10-04 RX ADMIN — ACETAMINOPHEN 650 MG: 325 TABLET ORAL at 08:23

## 2017-10-04 RX ADMIN — LIDOCAINE HYDROCHLORIDE 50 MG: 10 INJECTION, SOLUTION INFILTRATION; PERINEURAL at 11:37

## 2017-10-04 RX ADMIN — LABETALOL HYDROCHLORIDE 5 MG: 5 INJECTION, SOLUTION INTRAVENOUS at 12:07

## 2017-10-04 RX ADMIN — ONDANSETRON 4 MG: 2 INJECTION INTRAMUSCULAR; INTRAVENOUS at 12:37

## 2017-10-04 RX ADMIN — Medication 4 MG: at 12:37

## 2017-10-04 RX ADMIN — SODIUM CHLORIDE, POTASSIUM CHLORIDE, SODIUM LACTATE AND CALCIUM CHLORIDE 9 ML/HR: 600; 310; 30; 20 INJECTION, SOLUTION INTRAVENOUS at 08:22

## 2017-10-04 RX ADMIN — LIDOCAINE HYDROCHLORIDE 0.5 ML: 10 INJECTION, SOLUTION EPIDURAL; INFILTRATION; INTRACAUDAL; PERINEURAL at 08:23

## 2017-10-04 RX ADMIN — IBUPROFEN 800 MG: 800 TABLET, FILM COATED ORAL at 08:23

## 2017-10-04 RX ADMIN — DEXAMETHASONE SODIUM PHOSPHATE 8 MG: 4 INJECTION, SOLUTION INTRAMUSCULAR; INTRAVENOUS at 11:37

## 2017-10-04 RX ADMIN — FENTANYL CITRATE 50 MCG: 50 INJECTION, SOLUTION INTRAMUSCULAR; INTRAVENOUS at 12:20

## 2017-10-04 RX ADMIN — ESMOLOL HYDROCHLORIDE 30 MG: 10 INJECTION, SOLUTION INTRAVENOUS at 11:37

## 2017-10-04 NOTE — OP NOTE
Operation note      Preoperative diagnosis left-sided L5-S1 lumbar herniated disc    Postoperative diagnosis same    Procedures performed left-sided L5-S1 lumbar microdiscectomy    Surgeon: Pancho Sarmiento MD     Assistants: David Suggs    Anesthesia: Normal endotracheal anesthesia    ASA Class: 2    Findings hard disc extruded disc fragment left S1 Complications none    10 cc blood loss microscope used    Procedure in detail after formal written consent was obtained the patient was taken to the operating room endotracheally intubated given preoperative antimicrobial prophylaxis they were prepped and draped in the usual sterile manner all bony prominences and genitalia were padded to prevent neurologic injury.    At this point in time the patient was given local anesthesia at the operative level fluoroscopic guidance confirmed as 51 the correct level and tubular dilation system was placed on the lamina facet complex to ensure adequate exposure.    At this point time the microscope was used to visualize the exposure a hemilaminotomy and partial medial facetectomy was performed the yellow ligament was then identified and removed the traversing nerve root was identified and the disc space was identified as well by gentle mobilization of the nerve root.    At this point in time after incision of the disc, a sizable ligamentous fragment as well as cartilaginous fragment were noted and adequate neural decompression was confirmed.    At the resolution the case meticulous hemostasis was maintained and in the incision was closed in layers I was present personally performed the entirety of the proce  At the resolution the case meticulous hemostasis was maintained and in the incision was closed in layers I was present personally performed the entirety of the procedure until the skin closure.

## 2017-10-04 NOTE — ANESTHESIA POSTPROCEDURE EVALUATION
Patient: Stella Edmonds    Procedure Summary     Date Anesthesia Start Anesthesia Stop Room / Location    10/04/17 1132 1256 [facility] / [facility]       Procedure Diagnosis Surgeon Provider    lumbar MICROdiscectomy left-sided L5-S1 (Left Spine Lumbar) Lumbar radiculopathy  (Lumbar radiculopathy [M54.16]) MD Rock Bright MD          Anesthesia Type: general  Last vitals  BP   141/85 (10/04/17 1257)    Temp   98 °F (36.7 °C) (10/04/17 1257)    Pulse   83 (10/04/17 1257)   Resp   16 (10/04/17 1257)    SpO2   100 % (10/04/17 1257)      Post Anesthesia Care and Evaluation    Patient location during evaluation: PACU  Patient participation: complete - patient participated  Level of consciousness: awake  Pain score: 0  Pain management: adequate  Airway patency: patent  Anesthetic complications: No anesthetic complications  PONV Status: none  Cardiovascular status: hemodynamically stable and acceptable  Respiratory status: nonlabored ventilation, acceptable and nasal cannula  Hydration status: acceptable

## 2017-10-04 NOTE — ANESTHESIA PROCEDURE NOTES
Airway  Urgency: elective    Airway not difficult    General Information and Staff    Patient location during procedure: OR  CRNA: OMERO BERRY    Indications and Patient Condition  Indications for airway management: airway protection    Preoxygenated: yes  MILS not maintained throughout  Mask difficulty assessment: 1 - vent by mask    Final Airway Details  Final airway type: endotracheal airway      Successful airway: ETT  Cuffed: yes   Successful intubation technique: direct laryngoscopy  Endotracheal tube insertion site: oral  Blade: Rehan  Blade size: #3  ETT size: 6.5 mm  Cormack-Lehane Classification: grade I - full view of glottis  Placement verified by: chest auscultation and capnometry   Cuff volume (mL): 8  Measured from: lips  ETT to lips (cm): 20  Number of attempts at approach: 1    Additional Comments  Negative epigastric sounds, Breath sound equal bilaterally with symmetric chest rise and fall. Atraumatic, no damage to lips or teeth during intubation

## 2017-10-04 NOTE — INTERVAL H&P NOTE
"Pre-Op H&P (See Recent Office Note Attached)    Chief complaint: :Low back pain into left LE    Review of Systems:  General ROS:  no fever, chills, rashes, No change since last office visit  Cardiovascular ROS: no chest pain or dyspnea on exertion  Respiratory ROS: no cough, shortness of breath, or wheezing    Immunization History:   Influenza:  no  Pneumococcal:  no  Tetanus:  unknown    Vital Signs:  /89 (BP Location: Right arm, Patient Position: Lying)  Pulse 78  Temp 98 °F (36.7 °C) (Temporal Artery )   Resp 20  Ht 62\" (157.5 cm)  Wt 182 lb (82.6 kg)  SpO2 98%  BMI 33.29 kg/m2    Physical Exam:    CV:  S1S2 regular rate and rhythm, no murmur               Resp:  Clear to auscultation; respirations regular, even and unlabored    Results Review:    I reviewed the patient's new clinical results.    Cancer Staging (if applicable)  Cancer Patient: __ yes x__no __unknown; If yes, clinical stage T:__ N:__M:__, stage group or __N/A    Charlee Hernandez, APRN  10/4/2017   8:21 AM      "

## 2017-10-04 NOTE — ANESTHESIA PREPROCEDURE EVALUATION
Anesthesia Evaluation     Patient summary reviewed and Nursing notes reviewed   history of anesthetic complications: PONV         Airway   Mallampati: I  TM distance: >3 FB  Neck ROM: full  no difficulty expected  Dental      Pulmonary - negative pulmonary ROS   Cardiovascular     ECG reviewed    (+) hyperlipidemia      Neuro/Psych  (+) seizures, headaches, numbness,    GI/Hepatic/Renal/Endo    (+) obesity,  hiatal hernia, GERD, hypothyroidism,     Musculoskeletal     (+) back pain,   Abdominal    Substance History - negative use     OB/GYN negative ob/gyn ROS         Other                                        Anesthesia Plan    ASA 3     general     intravenous induction   Anesthetic plan and risks discussed with patient.    Plan discussed with CRNA.

## 2017-10-04 NOTE — PLAN OF CARE
Problem: Perioperative Period (Adult)  Goal: Signs and Symptoms of Listed Potential Problems Will be Absent or Manageable (Perioperative Period)  Outcome: Ongoing (interventions implemented as appropriate)    10/04/17 2808   Perioperative Period   Problems Assessed (Perioperative Period) pain   Problems Present (Perioperative Period) none            no striae/no hirsutism/no voice change/no cold intolerance/no heat intolerance

## 2017-10-09 ENCOUNTER — TELEPHONE (OUTPATIENT)
Dept: NEUROSURGERY | Facility: CLINIC | Age: 62
End: 2017-10-09

## 2017-10-09 NOTE — TELEPHONE ENCOUNTER
Provider:  Torsten  Caller: Stella Edmonds  Time of call:   09:32 AM  Phone #:  [phone]  Surgery:  Disc L5/S1  Surgery Date:  10/04/17  Last visit:   09/28/17  Next visit: 10/19/17    Reason for call:       Pt had surg wednesday, back of leg and foot are still numb, wants to know if this is normal

## 2017-10-09 NOTE — TELEPHONE ENCOUNTER
Pt verbalized understanding. She said the pain is off and on, she was at a football game and sat on the bleachers for a couple of hours is when her leg started hurting pretty bad, and her foot went numb.    Wound is fine, no drainage, numbness or redness around the incision.     Pt wants to know if its okay to shower with the wound being exposed and not covered.    And, if she can start bending and twisting. (I adv her that she needs to take it easy since she is 5 days PO, but that i'd ask to make sure)

## 2017-10-10 NOTE — TELEPHONE ENCOUNTER
"These are Signs of \"overdoing it.\"  Patient needs to take it easy and not lift, bend or twist until I see her back.  Patient should not lift anything greater than 10 pounds.  Patient can shower with wound uncovered.  Patient should not submerge incision in [place]  "

## 2017-10-19 ENCOUNTER — OFFICE VISIT (OUTPATIENT)
Dept: NEUROSURGERY | Facility: CLINIC | Age: 62
End: 2017-10-19

## 2017-10-19 VITALS
WEIGHT: 181 LBS | BODY MASS INDEX: 33.31 KG/M2 | HEIGHT: 62 IN | TEMPERATURE: 97.5 F | SYSTOLIC BLOOD PRESSURE: 126 MMHG | DIASTOLIC BLOOD PRESSURE: 88 MMHG

## 2017-10-19 DIAGNOSIS — M51.26 HERNIATED LUMBAR DISC WITHOUT MYELOPATHY: Primary | ICD-10-CM

## 2017-10-19 PROCEDURE — 99024 POSTOP FOLLOW-UP VISIT: CPT | Performed by: PHYSICIAN ASSISTANT

## 2017-10-19 NOTE — PROGRESS NOTES
Subjective   Patient ID: Stella Edmonds is a 62 y.o. female is here today for follow-up for wound check.    HPI:      Patient is well-known to the neurosurgical practice for having a craniotomy for arteriovenous malformation.  Patient has done well with this patient presented back with left leg sciatica pain that she underwent a left-sided L5-S1 microdiscectomy on 10/4/2017.  Patient presents for wound check and is doing very well.  Patient continues to have some numbness and tingling in the left leg and I've told her that this is normal.  I expect her symptoms to improve in the next 6-8 weeks.  Patient has questions about return to work.  We spent some time discussing activities and limitations for her return to work.  Patient should not lift, bend or twist.  Patient should avoid lifting greater than 20-30 pounds for the next 3 months.  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 truck check for half day and then call me and we will lift that restriction if she tolerates it well.    Patient is well pleased postsurgical outcome.  Patient continues to have left knee pain from a fall she had off of the truck.  Patient has orthopedic evaluation where they have recommended she had knee surgery for this.    The following portions of the patient's history were reviewed and updated as appropriate: allergies, current medications, past family history, past medical history, past social history, past surgical history and problem list.    Review of Systems   Constitutional: Negative for activity change, appetite change, chills, diaphoresis, fatigue, fever and unexpected weight change.   HENT: Negative for congestion, dental problem, drooling, ear discharge, ear pain, facial swelling, hearing loss, mouth sores, nosebleeds, postnasal drip, rhinorrhea, sinus pressure, sneezing, sore throat, tinnitus, trouble swallowing and voice change.    Eyes: Negative for photophobia, pain, discharge,  redness, itching and visual disturbance.   Respiratory: Negative for apnea, cough, choking, chest tightness, shortness of breath, wheezing and stridor.    Cardiovascular: Negative for chest pain, palpitations and leg swelling.   Gastrointestinal: Negative for abdominal distention, abdominal pain, anal bleeding, blood in stool, constipation, diarrhea, nausea, rectal pain and vomiting.   Endocrine: Negative for cold intolerance, heat intolerance, polydipsia, polyphagia and polyuria.   Genitourinary: Negative for decreased urine volume, difficulty urinating, dysuria, enuresis, flank pain, frequency, genital sores, hematuria and urgency.   Musculoskeletal: Negative for arthralgias, back pain, gait problem, joint swelling, myalgias, neck pain and neck stiffness.   Skin: Negative for color change, pallor, rash and wound.   Allergic/Immunologic: Negative for environmental allergies, food allergies and immunocompromised state.   Neurological: Positive for numbness. Negative for dizziness, tremors, seizures, syncope, facial asymmetry, speech difficulty, weakness, light-headedness and headaches.   Hematological: Negative for adenopathy. Does not bruise/bleed easily.   Psychiatric/Behavioral: Negative for agitation, behavioral problems, confusion, decreased concentration, dysphoric mood, hallucinations, self-injury, sleep disturbance and suicidal ideas. The patient is not nervous/anxious and is not hyperactive.          Objective     Physical Exam   GENEREAL:   The patient is in no acute distress, and is able to answer all questions appropriately.  Skin:  The incision is well-healed and well approximated.  No signs of infection, bleeding, or erythema.  Musculoskeletal: The patient’s strength is intact in upper and lower extremities upon direct testing.  Dorsiflexion and plantarflexion are equal bilaterally @ 5/5. Hip flexion against resistance.  The patient’s gait is normal without antalgia.  Neurologic: The patient is alert  and oriented by 3.  Recent memory, language, attention span, and fund of knowledge are all with within normal limits.   Sensation is equal bilaterally with no deficit.    Reflexes are 2+ at the patellar and Achilles tendon bilaterally.      Assessment/Plan   Independent Review of Radiographic Studies:    No films reviewed at this visit  Medical Decision Making:    At this point the patient is doing very well.  The patient is pleased with postsurgical outcome.  With the resolution of pain, I believe that it is adequate to see the patient back on an as-needed basis.  The patient has been educated on reasons that would necessitate a referral back to us in a more urgent manner.  The patient understands of his instructions and limitations for the best post-operative success.    Patient will call in 3 weeks to let me know how the trial of the fork truck went.  At that time I will lift the restriction if she tolerated it well.    It is been a pleasure providing neurosurgical care.    David Suggs PA-C    There are no diagnoses linked to this encounter.  No Follow-up on file.

## 2017-10-20 ENCOUNTER — TELEPHONE (OUTPATIENT)
Dept: NEUROSURGERY | Facility: CLINIC | Age: 62
End: 2017-10-20

## 2017-10-20 NOTE — TELEPHONE ENCOUNTER
Provider:  Torsten  Caller: Stella  Surgery:  Lumbar Discectomy  Surgery Date:  10/4/17  Last visit:   10/19/17  Next visit: n/a    BRANDON:         Reason for call:           Pt called to see if she could still take the anti inflammatory. Adv pt that this was ok.

## 2017-10-23 DIAGNOSIS — Z80.3 FAMILY HISTORY OF BREAST CANCER: Primary | ICD-10-CM

## 2017-11-20 ENCOUNTER — DOCUMENTATION (OUTPATIENT)
Dept: NEUROSURGERY | Facility: CLINIC | Age: 62
End: 2017-11-20

## 2017-11-20 NOTE — PROGRESS NOTES
Patient notified no bending, twisting or lifting for at least 6 weeks post-operatively. (per her request)

## 2017-12-04 ENCOUNTER — TELEPHONE (OUTPATIENT)
Dept: NEUROSURGERY | Facility: CLINIC | Age: 62
End: 2017-12-04

## 2017-12-04 ENCOUNTER — DOCUMENTATION (OUTPATIENT)
Dept: NEUROSURGERY | Facility: CLINIC | Age: 62
End: 2017-12-04

## 2017-12-04 NOTE — TELEPHONE ENCOUNTER
Provider:  Torsten  Caller: Stella  Surgery:  Lumbar Discectomy  Surgery Date:  10/4/17  Last visit:   10/19/17  Next visit: n/a        Reason for call:   pt's HR department called in today to clarify the work notes- on 10/19/17- BETTY Suggs placed on no rep BLT- for 3 mo p/o, 01/04/17.   on 11/29/17, BETTY Hutson placed pt on no restrictions.     Please advise?    they will need a new work letter.           Fax # [phone] (ATTN:Sara)

## 2017-12-04 NOTE — TELEPHONE ENCOUNTER
New work released created and faxed. W/ explanation of what happened.   I had  sign.     Encounter completed.

## 2017-12-04 NOTE — TELEPHONE ENCOUNTER
I put a note in Epic if you would print that fax that what ever.  I must of put those in on the wrong patient but I was thinking that that was the one.

## 2017-12-04 NOTE — PROGRESS NOTES
The patient's initial restrictions are in order until Jan 4, the release of restrictions was entered in error.     Krys Hutson PA-C

## 2017-12-04 NOTE — TELEPHONE ENCOUNTER
"The letter in pt's chart from [place] state \"no restrictions\".  Only 1 month prior to DC's letter David wrote one out with 3 months of restrictions.  Please clarify which the pt is to follow through with.   "

## 2017-12-08 ENCOUNTER — TELEPHONE (OUTPATIENT)
Dept: NEUROSURGERY | Facility: CLINIC | Age: 62
End: 2017-12-08

## 2017-12-08 NOTE — TELEPHONE ENCOUNTER
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 The other restrictions can stay the same. Call patient when ready so she can .  Alicia

## 2017-12-08 NOTE — TELEPHONE ENCOUNTER
Letter created and faxed to [phone].    Pt will come p/u her own copy as well.      Encounter closed.

## 2018-01-10 ENCOUNTER — TRANSCRIBE ORDERS (OUTPATIENT)
Dept: MAMMOGRAPHY | Facility: HOSPITAL | Age: 63
End: 2018-01-10

## 2018-01-10 ENCOUNTER — HOSPITAL ENCOUNTER (OUTPATIENT)
Dept: MAMMOGRAPHY | Facility: HOSPITAL | Age: 63
Discharge: HOME | End: 2018-01-10
Admitting: FAMILY MEDICINE

## 2018-01-10 DIAGNOSIS — R92.8 ABNORMAL MAMMOGRAM: ICD-10-CM

## 2018-01-10 DIAGNOSIS — R92.8 ABNORMAL MAMMOGRAM: Primary | ICD-10-CM

## 2018-01-10 PROCEDURE — 77066 DX MAMMO INCL CAD BI: CPT

## 2018-01-10 PROCEDURE — 77062 BREAST TOMOSYNTHESIS BI: CPT | Performed by: RADIOLOGY

## 2018-01-10 PROCEDURE — 77066 DX MAMMO INCL CAD BI: CPT | Performed by: RADIOLOGY

## 2018-01-10 PROCEDURE — G0279 TOMOSYNTHESIS, MAMMO: HCPCS

## 2018-01-25 ENCOUNTER — APPOINTMENT (OUTPATIENT)
Dept: PREADMISSION TESTING | Facility: HOSPITAL | Age: 63
End: 2018-01-25

## 2018-01-25 ENCOUNTER — LAB (OUTPATIENT)
Dept: LAB | Facility: HOSPITAL | Age: 63
End: 2018-01-25

## 2018-01-25 ENCOUNTER — CLINICAL SUPPORT (OUTPATIENT)
Dept: GENETICS | Facility: HOSPITAL | Age: 63
End: 2018-01-25

## 2018-01-25 DIAGNOSIS — Z80.41 FAMILY HISTORY OF OVARIAN CANCER: ICD-10-CM

## 2018-01-25 DIAGNOSIS — Z80.3 FAMILY HISTORY OF BREAST CANCER: Primary | ICD-10-CM

## 2018-01-25 DIAGNOSIS — Z13.79 GENETIC TESTING: Primary | ICD-10-CM

## 2018-01-25 PROCEDURE — 96040: CPT | Performed by: GENETIC COUNSELOR, MS

## 2018-01-26 NOTE — PROGRESS NOTES
Stella Edmonds is a 62-year-old female who was referred for genetic counseling due to a family history of breast cancer.  Ms. Edmonds was 15 years old at menarche and was 26 when she had her first child.  She has annual clinical breast exams and annual mammograms.  She reports having two previous breast biopsies, and is scheduled for a third next week.  Ms. Edmonds had a total hysterectomy and bilateral salpingo-oophorectomy at age 36 and was on HRT for 2-3 years.  She was interested in discussing her risk for a hereditary cancer syndrome and was interested in pursuing genetic testing. The BreastNext panel was ordered which includes VIMAL, BARD1, BRCA1, BRCA2, BRIP1, CDH1, CHEK2, MRE11A, MUTYH, NBN, NF1, PALB2, PTEN, RAD50, RAD51C, RAD51D, and TP53. Results are expected in 2-3 weeks.    PERTINENT FAMILY HISTORY: (See attached pedigree)   Pat. Grandmother:  Ovarian cancer, 85  Mother:   Breast cancer, 34    We do not have medical records to confirm the diagnoses in Ms. Edmonds’s family.    RISK ASSESSMENT:  Ms. Edmonds’s family history of early onset breast cancer raises the question of a hereditary cancer syndrome. In cases where an affected individual is not available to be tested, it is appropriate to offer testing to an individual who has not had a cancer diagnosis.  NCCN guidelines recommend offering testing to individuals with one close relative diagnosed with breast cancer under age 45; therefore Ms. Edmonds meets BRCA1/2 testing criteria.  We also discussed the availability of multigene panels which can evaluate for the BRCA genes and a number of other breast cancer susceptibility genes simultaneously.   We discussed that if testing comes back negative; her risk should be estimated based on family history risk modeling.  A risk greater than 20% warrants consideration of increased screening.  This risk assessment is based on the family history information provided at the time of the appointment.      GENETIC  COUNSELING (50 minutes): We reviewed the family history information in detail.  Cases of cancer follow three general patterns: sporadic, familial, and hereditary.  While most cancer is sporadic, some cases appear to occur in family clusters.  These cases are said to be familial and account for 10-20% of breast cancer cases.  Familial cases may be due to a combination of shared genes and environmental factors.  In even fewer families, the cancer is said to be inherited, and the genes responsible for the cancer are known.    Family histories typical of hereditary cancer syndromes usually include multiple first- and second-degree relatives diagnosed with cancer types that define a syndrome.  These cases tend to be diagnosed at younger-than-expected ages and can be bilateral or multifocal.  The cancer in these families follows an autosomal dominant inheritance pattern, which indicates the likely presence of a mutation in a cancer susceptibility gene.  Children and siblings of an individual believed to carry this mutation have a 50% chance of inheriting that mutation, thereby inheriting the increased risk to develop cancer.  These mutations can be passed down from the maternal or the paternal lineage.    Hereditary breast cancer accounts for 5-10% of all cases of breast cancer.  A significant proportion of hereditary breast cancer can be attributed to mutations in the BRCA1 and BRCA2 genes.  Mutations in these genes confer an increased risk for breast cancer, ovarian cancer, male breast cancer, prostate cancer and pancreatic cancer.  Women with a BRCA1 or BRCA2 mutation have up to an 87% lifetime risk for breast cancer and up to a 44% lifetime risk of ovarian cancer.     There are other, more rare, hereditary breast cancer syndromes. Some of these conditions have well defined cancer risks and established management guidelines.  Other genes that can be tested for have been more recently described, and there may be less  data regarding the risks and therefore may not have established management guidelines.  We discussed these limitations at length.  Based on Ms. Edmonds’s family history and her desire to get more information regarding her personal risks, she opted to pursue testing through a panel evaluating several other genes known to increase the risk for breast cancer.    GENETIC TESTING:  The risks, benefits and limitations of gene testing and implications for clinical management following testing were reviewed.  DNA test results can influence decisions regarding screening, prevention and surgical management.  Genetic testing can have significant psychological implications for both individuals and families.  Also discussed was the possibility of employment and insurance discrimination based on genetic test results and the laws in place to prevent this.    We discussed panel testing, which would involve testing for BRCA1/2, as well as fifteen other genes included on the particular panel (other genes include VIMAL, BARD1, BRIP1, CDH1, CHEK2, MRE11A, MUTYH, NBN, NF1, PALB2, PTEN, RAD50, RAD51C, RAD51D, and TP53). The benefits and limitations of genetic testing were discussed and Ms. Edmonds decided to pursue testing of the genes via the BreastNext panel. The implications of a positive or negative test result were discussed.  The importance of initiating testing on an affected family member was emphasized. In general, a negative genetic test is most informative if a mutation has first been established in an affected member of the family. We discussed the possibility that, in some cases, genetic test results may be informative or may be ambiguous due to the identification of a genetic variant. These variants may or may not be associated with an increased cancer risk.  Given her family history, a negative test result does not eliminate all breast cancer risk, although the risk would not be as high as it would with positive genetic  testing.      The implications of a positive or negative test result were discussed.  We also discussed the possibility that, in some cases, genetic test results may be informative or may be ambiguous due to the identification of a genetic variant.  These variants may or may not be associated with an increased cancer risk. The limited value of negative test results was emphasized, particularly given the significant population risk for breast cancer and the existence of other cancer susceptibility genes.  Given her family history, a negative test result would not eliminate all breast cancer risk, although the risk would not be as high as it would with positive genetic testing.      Plan:  Ms. Edmonds opted to pursue testing via the BreastNext panel through [employer]. Results from this testing are expected in 2-3 weeks and we will contact Ms. Edmonds with her results at that time.    Melissa Sánchez MS, [facility] [place], [facility]  Certified Licensed Genetic Counselor

## 2018-02-02 ENCOUNTER — HOSPITAL ENCOUNTER (OUTPATIENT)
Dept: MAMMOGRAPHY | Facility: HOSPITAL | Age: 63
Discharge: HOME | End: 2018-02-02

## 2018-02-02 ENCOUNTER — HOSPITAL ENCOUNTER (OUTPATIENT)
Dept: MAMMOGRAPHY | Facility: HOSPITAL | Age: 63
Discharge: HOME | End: 2018-02-02
Admitting: RADIOLOGY

## 2018-02-02 DIAGNOSIS — R92.8 ABNORMAL MAMMOGRAM: ICD-10-CM

## 2018-02-02 PROCEDURE — 19081 BX BREAST 1ST LESION STRTCTC: CPT | Performed by: RADIOLOGY

## 2018-02-02 PROCEDURE — 88305 TISSUE EXAM BY PATHOLOGIST: CPT | Performed by: RADIOLOGY

## 2018-02-02 PROCEDURE — 77065 DX MAMMO INCL CAD UNI: CPT | Performed by: RADIOLOGY

## 2018-02-02 PROCEDURE — 76098 X-RAY EXAM SURGICAL SPECIMEN: CPT

## 2018-02-02 PROCEDURE — A4648 IMPLANTABLE TISSUE MARKER: HCPCS

## 2018-02-02 RX ORDER — LIDOCAINE HYDROCHLORIDE 10 MG/ML
5 INJECTION, SOLUTION INFILTRATION; PERINEURAL ONCE
Status: COMPLETED | OUTPATIENT
Start: 2018-02-02 | End: 2018-02-02

## 2018-02-02 RX ORDER — LIDOCAINE HYDROCHLORIDE AND EPINEPHRINE 10; 10 MG/ML; UG/ML
10 INJECTION, SOLUTION INFILTRATION; PERINEURAL ONCE
Status: COMPLETED | OUTPATIENT
Start: 2018-02-02 | End: 2018-02-02

## 2018-02-02 RX ADMIN — LIDOCAINE HYDROCHLORIDE,EPINEPHRINE BITARTRATE 10 ML: 10; .01 INJECTION, SOLUTION INFILTRATION; PERINEURAL at 12:33

## 2018-02-02 RX ADMIN — LIDOCAINE HYDROCHLORIDE 5 ML: 10 INJECTION, SOLUTION INFILTRATION; PERINEURAL at 12:33

## 2018-02-08 ENCOUNTER — TELEPHONE (OUTPATIENT)
Dept: MAMMOGRAPHY | Facility: HOSPITAL | Age: 63
End: 2018-02-08

## 2018-02-08 NOTE — TELEPHONE ENCOUNTER
02.08.18 @ 1710: Pt notified of pathology results and recommendations. Verbalizes understanding. Denies discomfort. Denies any signs and symptoms of infection.

## 2018-02-16 ENCOUNTER — TELEPHONE (OUTPATIENT)
Dept: NEUROSURGERY | Facility: CLINIC | Age: 63
End: 2018-02-16

## 2018-02-16 ENCOUNTER — DOCUMENTATION (OUTPATIENT)
Dept: NEUROSURGERY | Facility: CLINIC | Age: 63
End: 2018-02-16

## 2018-02-16 NOTE — TELEPHONE ENCOUNTER
Provider:  Torsten  Caller: Stella   Phone #:  [phone]  Surgery:  Lumbar Discectomy L5-S1  Surgery Date:  10/4/17  Last visit:   10/19/17  Next visit: n/a    BRANDON:         Reason for call:             Pt is requesting RTW with no restrictions, I will type letter for sig if appropriate

## 2018-02-19 ENCOUNTER — TRANSCRIBE ORDERS (OUTPATIENT)
Dept: ADMINISTRATIVE | Facility: HOSPITAL | Age: 63
End: 2018-02-19

## 2018-02-19 ENCOUNTER — DOCUMENTATION (OUTPATIENT)
Dept: GENETICS | Facility: HOSPITAL | Age: 63
End: 2018-02-19

## 2018-02-19 DIAGNOSIS — R92.8 ABNORMAL MAMMOGRAM: Primary | ICD-10-CM

## 2018-02-19 NOTE — PROGRESS NOTES
Stella Edmonds is a 62-year-old female who was referred for genetic counseling due to a family history of breast cancer.  Ms. Edmonds was 15 years old at menarche and was 26 when she had her first child.  She has annual clinical breast exams and annual mammograms.  She reports having two previous breast biopsies, and is scheduled for a third next week.  Ms. Edmonds had a total hysterectomy and bilateral salpingo-oophorectomy at age 36 and was on HRT for 2-3 years.  She was interested in discussing her risk for a hereditary cancer syndrome and was interested in pursuing genetic testing. The BreastNext panel was ordered which includes VIMAL, BARD1, BRCA1, BRCA2, BRIP1, CDH1, CHEK2, MRE11A, MUTYH, NBN, NF1, PALB2, PTEN, RAD50, RAD51C, RAD51D, and TP53. Genetic testing was negative by sequencing and rearrangement testing for deleterious mutations in the 17 genes included on the BreastNext panel (see attached results). These normal results were discussed with Ms. Edmonds by telephone on 2/19/18.      PERTINENT FAMILY HISTORY: (See attached pedigree)   Pat. Grandmother:  Ovarian cancer, 85  Mother:   Breast cancer, 34    We do not have medical records to confirm the diagnoses in Ms. Edmonds’ family.    RISK ASSESSMENT:  Ms. Edmonds’ family history of early onset breast cancer raises the question of a hereditary cancer syndrome. In cases where an affected individual is not available to be tested, it is appropriate to offer testing to an individual who has not had a cancer diagnosis.  NCCN guidelines recommend offering testing to individuals with one close relative diagnosed with breast cancer under age 45; therefore Ms. Edmonds meets BRCA1/2 testing criteria.  We also discussed the availability of multigene panels which can evaluate for the BRCA genes and a number of other breast cancer susceptibility genes simultaneously.   We discussed that if testing comes back negative; her risk should be estimated based on family  history risk modeling.     Testing was negative for deleterious mutations in Ms. Edmonds, greatly reducing the likelihood for her to have a hereditary cancer syndrome. Based on computer modeling, Ms. Edmonds’s lifetime risk for breast cancer was estimated to be 8.9% (VICKY), only slightly elevated over the average 60-year-old woman’s risk of 7.3%.  Per NCCN guidelines, a risk greater than 20% is considered high risk and warrants increased screening; Ms. Edmonds’s risk does not fall into this category.  This risk assessment is based on the information that was provided during the session and may change if new information is obtained.    GENETIC COUNSELING: We reviewed the family history information in detail.  Cases of cancer follow three general patterns: sporadic, familial, and hereditary.  While most cancer is sporadic, some cases appear to occur in family clusters.  These cases are said to be familial and account for 10-20% of breast cancer cases.  Familial cases may be due to a combination of shared genes and environmental factors.  In even fewer families, the cancer is said to be inherited, and the genes responsible for the cancer are known.    Family histories typical of hereditary cancer syndromes usually include multiple first- and second-degree relatives diagnosed with cancer types that define a syndrome.  These cases tend to be diagnosed at younger-than-expected ages and can be bilateral or multifocal.  The cancer in these families follows an autosomal dominant inheritance pattern, which indicates the likely presence of a mutation in a cancer susceptibility gene.  Children and siblings of an individual believed to carry this mutation have a 50% chance of inheriting that mutation, thereby inheriting the increased risk to develop cancer.  These mutations can be passed down from the maternal or the paternal lineage.    Hereditary breast cancer accounts for 5-10% of all cases of breast cancer.  A significant  proportion of hereditary breast cancer can be attributed to mutations in the BRCA1 and BRCA2 genes.  Mutations in these genes confer an increased risk for breast cancer, ovarian cancer, male breast cancer, prostate cancer and pancreatic cancer.  Women with a BRCA1 or BRCA2 mutation have up to an 87% lifetime risk for breast cancer and up to a 44% lifetime risk of ovarian cancer.     There are other, more rare, hereditary breast cancer syndromes. Some of these conditions have well defined cancer risks and established management guidelines.  Other genes that can be tested for have been more recently described, and there may be less data regarding the risks and therefore may not have established management guidelines.  We discussed these limitations at length.  Based on Ms. Edmonds’s family history and her desire to get more information regarding her personal risks, she opted to pursue testing through a panel evaluating several other genes known to increase the risk for breast cancer.    GENETIC TESTING:  The risks, benefits and limitations of gene testing and implications for clinical management following testing were reviewed.  DNA test results can influence decisions regarding screening, prevention and surgical management.  Genetic testing can have significant psychological implications for both individuals and families.  Also discussed was the possibility of employment and insurance discrimination based on genetic test results and the laws in place to prevent this.    We discussed panel testing, which would involve testing for BRCA1/2, as well as fifteen other genes included on the particular panel (other genes include VIMAL, BARD1, BRIP1, CDH1, CHEK2, MRE11A, MUTYH, NBN, NF1, PALB2, PTEN, RAD50, RAD51C, RAD51D, and TP53). The benefits and limitations of genetic testing were discussed and Ms. Edmonds decided to pursue testing of the genes via the BreastNext panel. The implications of a positive or negative test  result were discussed.  The importance of initiating testing on an affected family member was emphasized. In general, a negative genetic test is most informative if a mutation has first been established in an affected member of the family. We discussed the possibility that, in some cases, genetic test results may be informative or may be ambiguous due to the identification of a genetic variant. These variants may or may not be associated with an increased cancer risk.  Given her family history, a negative test result does not eliminate all breast cancer risk, although the risk would not be as high as it would with positive genetic testing.      The implications of a positive or negative test result were discussed.  We also discussed the possibility that, in some cases, genetic test results may be informative or may be ambiguous due to the identification of a genetic variant.  These variants may or may not be associated with an increased cancer risk. The limited value of negative test results was emphasized, particularly given the significant population risk for breast cancer and the existence of other cancer susceptibility genes.  Given her family history, a negative test result would not eliminate all breast cancer risk, although the risk would not be as high as it would with positive genetic testing.      TEST RESULTS:  Genetic testing was negative by sequencing and rearrangement testing for mutations in BRCA1/2 and 15 additional genes evaluated on the BreastNext panel.  The negative result greatly lowers the risk of a hereditary cancer syndrome for Ms. Edmonds.  Since an affected individual in the family has not had testing, it is possible that the family history is due to a hereditary cancer syndrome which Ms. Edmonds did not happen to inherit.  This assessment is based on the information provided at the time of the consultation.    CANCER PREVENTION:  Options available to individuals with an elevated lifetime  risk for breast and/or ovarian cancer were briefly discussed, including increased surveillance, chemoprevention and prophylactic surgery (mastectomy and/or oophorectomy).  Based on computer modeling, Ms. Edmonds’s lifetime risk for breast cancer would not be considered “high risk”, which is typically defined as a lifetime risk greater than 20%.  General population screening guidelines include annual clinical breast exam, annual mammography, and monthly self-breast exam.      PLAN:  Genetic counseling remains available for Ms. Edmonds.  If Ms. Edmonds has any questions, concerns, or updates to the family history she is welcome to call us at [phone].    Melissa Sánchez MS, [facility] [place], C  Certified Licensed Genetic Counselor      Cc: ZOHAIB Ventura MD

## 2018-02-26 ENCOUNTER — TELEPHONE (OUTPATIENT)
Dept: NEUROSURGERY | Facility: CLINIC | Age: 63
End: 2018-02-26

## 2018-02-26 NOTE — TELEPHONE ENCOUNTER
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 Said that she has also been lifting a lot at work.     Where is it located: left leg, down to the back of calf     How long has this been going on/is this new or the same as before surgery: same as before surgery    Characteristics of symptom/severity: numbness, no pain     Timing- Is it constant or intermittent: constant    What makes it worse: nothing that she can think of    What makes it better: laying flat on her back or sits down for a minute for it'll stop, but as soon as she gets back up the numbness will come back.     What therapies/medications have you tried: nothing

## 2018-02-26 NOTE — TELEPHONE ENCOUNTER
Provider:  Torsten  Caller: Stella Edmonds  Time of call:   02:51 PM  Phone #:  [phone]  Surgery:  LUMBAR DISCECTOMY MICRO ENDOSCOPIC L5-S1 RIGHT  Surgery Date:  10/04/17  Last visit:   10/19/17  Next visit: none    Reason for call:       Pt called in to say that her left leg has started to go numb and wanted to know if this was normal?    Does she need to come in to be seen?

## 2018-02-27 NOTE — TELEPHONE ENCOUNTER
I spoke with patient and she was appreciative of the call.  She had such good things to say about Dr. Sarmiento and David.  She will call us back on Monday and let us know how she is doing.

## 2018-02-27 NOTE — TELEPHONE ENCOUNTER
Called patient no answer... No need to rush back... New activity causing new numbness is ok in the absence of leg pain or weakness is ok... Tell her to take it easy over the weekend, take Nsaids around the clock, heat/ice and let us know how she is on Monday.     I am always willing to see her... She is a good patient and we have a long history

## 2018-03-30 ENCOUNTER — TELEPHONE (OUTPATIENT)
Dept: NEUROSURGERY | Facility: CLINIC | Age: 63
End: 2018-03-30

## 2018-03-30 NOTE — TELEPHONE ENCOUNTER
Patient is okay to get knee surgery.  A little numbness is okay.  This can be expected after that procedure.

## 2018-03-30 NOTE — TELEPHONE ENCOUNTER
Provider:  Torsten  Caller: pt  Time of call: 12:04    Phone #:  [phone]  Surgery:  LUMBAR DISCECTOMY MICRO ENDOSCOPIC L5-S1 RIGHT  Surgery Date:  10/4/2017  Last visit:   10/19/2017  Next visit: QUYNH TAYLOR:         Reason for call:         Pt called to inform us that she is having knee surgery on 4/16.  Since her leg is still going numb, should she hold off on the surgery?

## 2018-04-10 ENCOUNTER — APPOINTMENT (OUTPATIENT)
Dept: PREADMISSION TESTING | Facility: HOSPITAL | Age: 63
End: 2018-04-10

## 2018-05-31 ENCOUNTER — OUTSIDE FACILITY SERVICE (OUTPATIENT)
Dept: GASTROENTEROLOGY | Facility: CLINIC | Age: 63
End: 2018-05-31

## 2018-05-31 ENCOUNTER — LAB REQUISITION (OUTPATIENT)
Dept: LAB | Facility: HOSPITAL | Age: 63
End: 2018-05-31

## 2018-05-31 DIAGNOSIS — B96.81 HELICOBACTER PYLORI (H. PYLORI) AS THE CAUSE OF DISEASES CLASSIFIED ELSEWHERE: ICD-10-CM

## 2018-05-31 DIAGNOSIS — K90.0 CELIAC DISEASE: ICD-10-CM

## 2018-05-31 PROCEDURE — 43239 EGD BIOPSY SINGLE/MULTIPLE: CPT | Performed by: INTERNAL MEDICINE

## 2018-05-31 PROCEDURE — 88305 TISSUE EXAM BY PATHOLOGIST: CPT | Performed by: INTERNAL MEDICINE

## 2018-05-31 PROCEDURE — G0500 MOD SEDAT ENDO SERVICE >5YRS: HCPCS | Performed by: INTERNAL MEDICINE

## 2018-06-01 LAB
CYTO UR: NORMAL
LAB AP CASE REPORT: NORMAL
LAB AP CLINICAL INFORMATION: NORMAL
Lab: NORMAL
PATH REPORT.FINAL DX SPEC: NORMAL
PATH REPORT.GROSS SPEC: NORMAL

## 2018-06-04 ENCOUNTER — TELEPHONE (OUTPATIENT)
Dept: GASTROENTEROLOGY | Facility: CLINIC | Age: 63
End: 2018-06-04

## 2018-06-04 NOTE — TELEPHONE ENCOUNTER
----- Message from Sang Jeffers MD sent at 6/3/2018  8:28 AM EDT -----  Please call Stella and inform her she does not have celiac disease or h pylori  Dr. Jeffers

## 2018-06-04 NOTE — TELEPHONE ENCOUNTER
Gave patient the message, patient will call back in 1-2 weeks to let us know if Nexium is working.

## 2018-06-05 ENCOUNTER — TELEPHONE (OUTPATIENT)
Dept: GASTROENTEROLOGY | Facility: CLINIC | Age: 63
End: 2018-06-05

## 2018-06-05 NOTE — TELEPHONE ENCOUNTER
He had one episode of rectal bleeding.  I would like her to get her stools checked by Dr. Blanco.  If they're positive then she should do 3 of them then definitely I would repeat her colonoscopy.  If she notices blood again I would repeat her colonoscopy.  Please also make sure that she's doing her fiber supplements that she does have diverticulosis.    Dr. Jeffers

## 2018-06-05 NOTE — TELEPHONE ENCOUNTER
Patient called back today and would like to know if she needs to have another colonoscopy? She states she spoke with you about blood in the stool with she had her EGD.

## 2018-06-26 ENCOUNTER — HOSPITAL ENCOUNTER (OUTPATIENT)
Age: 63
End: 2018-06-26
Payer: COMMERCIAL

## 2018-06-26 DIAGNOSIS — R10.9: Primary | ICD-10-CM

## 2018-06-26 PROCEDURE — 76705 ECHO EXAM OF ABDOMEN: CPT

## 2018-06-29 NOTE — TELEPHONE ENCOUNTER
Patient called stated that her doctors office only did the cologuard and did not do the culture. She would like to know if we can put in the order for the stool test and she will have them done.     [facility]   [phone]    Will call and get results from [facility]

## 2018-07-18 ENCOUNTER — HOSPITAL ENCOUNTER (OUTPATIENT)
Age: 63
End: 2018-07-18
Payer: COMMERCIAL

## 2018-07-18 DIAGNOSIS — R10.84: Primary | ICD-10-CM

## 2018-07-18 PROCEDURE — 78226 HEPATOBILIARY SYSTEM IMAGING: CPT

## 2018-07-18 PROCEDURE — A9537 TC99M MEBROFENIN: HCPCS

## 2018-07-31 ENCOUNTER — HOSPITAL ENCOUNTER (OUTPATIENT)
Dept: MAMMOGRAPHY | Facility: HOSPITAL | Age: 63
Discharge: HOME | End: 2018-07-31
Admitting: FAMILY MEDICINE

## 2018-07-31 ENCOUNTER — TRANSCRIBE ORDERS (OUTPATIENT)
Dept: MAMMOGRAPHY | Facility: HOSPITAL | Age: 63
End: 2018-07-31

## 2018-07-31 DIAGNOSIS — R92.8 ABNORMAL MAMMOGRAM: ICD-10-CM

## 2018-07-31 DIAGNOSIS — R92.8 ABNORMAL MAMMOGRAM: Primary | ICD-10-CM

## 2018-07-31 PROCEDURE — 77066 DX MAMMO INCL CAD BI: CPT | Performed by: RADIOLOGY

## 2018-07-31 PROCEDURE — 77062 BREAST TOMOSYNTHESIS BI: CPT | Performed by: RADIOLOGY

## 2018-07-31 PROCEDURE — G0279 TOMOSYNTHESIS, MAMMO: HCPCS

## 2018-07-31 PROCEDURE — 77066 DX MAMMO INCL CAD BI: CPT

## 2018-09-10 ENCOUNTER — HOSPITAL ENCOUNTER (OUTPATIENT)
Age: 63
End: 2018-09-10
Payer: COMMERCIAL

## 2018-09-10 DIAGNOSIS — R10.0: Primary | ICD-10-CM

## 2018-09-10 LAB
BUN SERPL-MCNC: 24 MG/DL (ref 7–18)
CREAT BLD-SCNC: 0.78 MG/DL (ref 0.55–1.02)
ESTIMATED GLOMERULAR FILT RATE: 75 ML/MIN (ref 60–?)
GFR (AFRICAN AMERICAN): 91 ML/MIN (ref 60–?)

## 2018-09-10 PROCEDURE — 82565 ASSAY OF CREATININE: CPT

## 2018-09-10 PROCEDURE — 74177 CT ABD & PELVIS W/CONTRAST: CPT

## 2018-09-10 PROCEDURE — 36415 COLL VENOUS BLD VENIPUNCTURE: CPT

## 2018-09-10 PROCEDURE — 84520 ASSAY OF UREA NITROGEN: CPT

## 2018-11-13 ENCOUNTER — TELEPHONE (OUTPATIENT)
Dept: NEUROSURGERY | Facility: CLINIC | Age: 63
End: 2018-11-13

## 2018-11-13 NOTE — TELEPHONE ENCOUNTER
"Provider:  Torsten  Caller: patient  Time of call:  8:56   Phone #:  [phone]  Surgery:  Lumbar discectomy microdisectomy L5-S1 right  Surgery Date:  10/04/17  Last visit:   10/19/17  Next visit: None    BRANDON:         Reason for call:     Patient said that she has had a cough for a little over a week.  Last Thursday she coughed really hard and she heard a \"pop\" in her head.   She went to see her PCP because she has a URI.  They gave her a steroid injection and a antibiotic.  Today she is going to the dentist because she has some pain down in her jaw.  She is not in pain, however she complains of a dull aches in her head.  (She had a surgery with Dr. Sarmiento in the past, that required screws to be placed in her head. 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    "

## 2018-11-13 NOTE — TELEPHONE ENCOUNTER
Called patient and explained that she is probably having maxillary sinusitis is causing pain in her jaw.  This would be obvious front runner.  After she finished up her antibiotics, if she is still having some issues with her jaw, I will be happy to see her back in the clinic.  Nothing further to do on this encounter.  She will call if she has any other questions or concerns.

## 2019-01-10 ENCOUNTER — HOSPITAL ENCOUNTER (OUTPATIENT)
Age: 64
End: 2019-01-10
Payer: COMMERCIAL

## 2019-01-10 DIAGNOSIS — R07.81: Primary | ICD-10-CM

## 2019-01-10 PROCEDURE — 71101 X-RAY EXAM UNILAT RIBS/CHEST: CPT

## 2019-02-01 ENCOUNTER — TRANSCRIBE ORDERS (OUTPATIENT)
Dept: MAMMOGRAPHY | Facility: HOSPITAL | Age: 64
End: 2019-02-01

## 2019-02-01 ENCOUNTER — HOSPITAL ENCOUNTER (OUTPATIENT)
Dept: MAMMOGRAPHY | Facility: HOSPITAL | Age: 64
Discharge: HOME | End: 2019-02-01
Admitting: FAMILY MEDICINE

## 2019-02-01 DIAGNOSIS — R92.8 ABNORMAL MAMMOGRAM: ICD-10-CM

## 2019-02-01 DIAGNOSIS — R92.8 ABNORMAL MAMMOGRAM: Primary | ICD-10-CM

## 2019-02-01 PROCEDURE — 77062 BREAST TOMOSYNTHESIS BI: CPT | Performed by: RADIOLOGY

## 2019-02-01 PROCEDURE — 77066 DX MAMMO INCL CAD BI: CPT | Performed by: RADIOLOGY

## 2019-02-01 PROCEDURE — 77066 DX MAMMO INCL CAD BI: CPT

## 2019-02-01 PROCEDURE — G0279 TOMOSYNTHESIS, MAMMO: HCPCS

## 2019-02-05 ENCOUNTER — HOSPITAL ENCOUNTER (OUTPATIENT)
Dept: MAMMOGRAPHY | Facility: HOSPITAL | Age: 64
Discharge: HOME | End: 2019-02-05

## 2019-02-05 ENCOUNTER — HOSPITAL ENCOUNTER (OUTPATIENT)
Dept: MAMMOGRAPHY | Facility: HOSPITAL | Age: 64
Discharge: HOME | End: 2019-02-05
Admitting: RADIOLOGY

## 2019-02-05 DIAGNOSIS — R92.8 ABNORMAL MAMMOGRAM: ICD-10-CM

## 2019-02-05 PROCEDURE — A4648 IMPLANTABLE TISSUE MARKER: HCPCS

## 2019-02-05 PROCEDURE — 88305 TISSUE EXAM BY PATHOLOGIST: CPT | Performed by: FAMILY MEDICINE

## 2019-02-05 PROCEDURE — 77065 DX MAMMO INCL CAD UNI: CPT | Performed by: RADIOLOGY

## 2019-02-05 PROCEDURE — 19081 BX BREAST 1ST LESION STRTCTC: CPT | Performed by: RADIOLOGY

## 2019-02-05 PROCEDURE — 76098 X-RAY EXAM SURGICAL SPECIMEN: CPT

## 2019-02-05 RX ORDER — LIDOCAINE HYDROCHLORIDE AND EPINEPHRINE 10; 10 MG/ML; UG/ML
10 INJECTION, SOLUTION INFILTRATION; PERINEURAL ONCE
Status: COMPLETED | OUTPATIENT
Start: 2019-02-05 | End: 2019-02-05

## 2019-02-05 RX ORDER — LIDOCAINE HYDROCHLORIDE 10 MG/ML
5 INJECTION, SOLUTION INFILTRATION; PERINEURAL ONCE
Status: COMPLETED | OUTPATIENT
Start: 2019-02-05 | End: 2019-02-05

## 2019-02-05 RX ADMIN — LIDOCAINE HYDROCHLORIDE 1 ML: 10 INJECTION, SOLUTION INFILTRATION; PERINEURAL at 13:54

## 2019-02-05 RX ADMIN — LIDOCAINE HYDROCHLORIDE,EPINEPHRINE BITARTRATE 4 ML: 10; .01 INJECTION, SOLUTION INFILTRATION; PERINEURAL at 13:55

## 2019-02-06 ENCOUNTER — TRANSCRIBE ORDERS (OUTPATIENT)
Dept: MAMMOGRAPHY | Facility: HOSPITAL | Age: 64
End: 2019-02-06

## 2019-02-06 ENCOUNTER — TELEPHONE (OUTPATIENT)
Dept: MAMMOGRAPHY | Facility: HOSPITAL | Age: 64
End: 2019-02-06

## 2019-02-06 DIAGNOSIS — R92.8 ABNORMAL MAMMOGRAM: Primary | ICD-10-CM

## 2019-02-06 LAB
CYTO UR: NORMAL
LAB AP CASE REPORT: NORMAL
LAB AP CLINICAL INFORMATION: NORMAL
LAB AP DIAGNOSIS COMMENT: NORMAL
PATH REPORT.FINAL DX SPEC: NORMAL
PATH REPORT.GROSS SPEC: NORMAL

## 2019-02-06 NOTE — TELEPHONE ENCOUNTER
Pt notified of pathology results and recommendations. Verbalizes understanding. Denies discomfort. Denies signs and symptoms of infection. Questions answered, verbalized understanding.

## 2019-02-08 ENCOUNTER — TELEPHONE (OUTPATIENT)
Dept: MAMMOGRAPHY | Facility: HOSPITAL | Age: 64
End: 2019-02-08

## 2019-02-08 NOTE — TELEPHONE ENCOUNTER
Returned pt call; States she received a letter from her PCP that she does not understand. Questions answered, support given. Encouraged to speak with her PCP or call back with further questions or concerns. Patient verbalized understanding.

## 2019-02-20 ENCOUNTER — APPOINTMENT (OUTPATIENT)
Dept: MAMMOGRAPHY | Facility: HOSPITAL | Age: 64
End: 2019-02-20

## 2019-04-15 ENCOUNTER — HOSPITAL ENCOUNTER (OUTPATIENT)
Age: 64
End: 2019-04-15
Payer: COMMERCIAL

## 2019-04-15 DIAGNOSIS — R07.81: Primary | ICD-10-CM

## 2019-04-15 PROCEDURE — 71250 CT THORAX DX C-: CPT

## 2019-08-01 ENCOUNTER — HOSPITAL ENCOUNTER (OUTPATIENT)
Dept: MAMMOGRAPHY | Facility: HOSPITAL | Age: 64
Discharge: HOME | End: 2019-08-01
Admitting: FAMILY MEDICINE

## 2019-08-01 ENCOUNTER — TRANSCRIBE ORDERS (OUTPATIENT)
Dept: MAMMOGRAPHY | Facility: HOSPITAL | Age: 64
End: 2019-08-01

## 2019-08-01 DIAGNOSIS — R92.8 ABNORMAL MAMMOGRAM: Primary | ICD-10-CM

## 2019-08-01 DIAGNOSIS — R92.8 ABNORMAL MAMMOGRAM: ICD-10-CM

## 2019-08-01 PROCEDURE — 77061 BREAST TOMOSYNTHESIS UNI: CPT | Performed by: RADIOLOGY

## 2019-08-01 PROCEDURE — 77065 DX MAMMO INCL CAD UNI: CPT | Performed by: RADIOLOGY

## 2019-08-01 PROCEDURE — 77065 DX MAMMO INCL CAD UNI: CPT

## 2019-08-06 ENCOUNTER — APPOINTMENT (OUTPATIENT)
Dept: MAMMOGRAPHY | Facility: HOSPITAL | Age: 64
End: 2019-08-06

## 2019-08-06 ENCOUNTER — HOSPITAL ENCOUNTER (OUTPATIENT)
Age: 64
End: 2019-08-06
Payer: COMMERCIAL

## 2019-08-06 DIAGNOSIS — M79.671: Primary | ICD-10-CM

## 2019-08-06 PROCEDURE — 73630 X-RAY EXAM OF FOOT: CPT

## 2019-10-15 ENCOUNTER — HOSPITAL ENCOUNTER (OUTPATIENT)
Age: 64
End: 2019-10-15
Payer: COMMERCIAL

## 2019-10-15 DIAGNOSIS — R51: ICD-10-CM

## 2019-10-15 DIAGNOSIS — H93.12: ICD-10-CM

## 2019-10-15 DIAGNOSIS — Q28.2: ICD-10-CM

## 2019-10-15 DIAGNOSIS — R42: Primary | ICD-10-CM

## 2019-10-15 PROCEDURE — 70544 MR ANGIOGRAPHY HEAD W/O DYE: CPT

## 2020-01-29 ENCOUNTER — HOSPITAL ENCOUNTER (OUTPATIENT)
Age: 65
End: 2020-01-29
Payer: COMMERCIAL

## 2020-01-29 DIAGNOSIS — R07.81: Primary | ICD-10-CM

## 2020-01-29 PROCEDURE — 71101 X-RAY EXAM UNILAT RIBS/CHEST: CPT

## 2020-02-03 ENCOUNTER — TRANSCRIBE ORDERS (OUTPATIENT)
Dept: MAMMOGRAPHY | Facility: HOSPITAL | Age: 65
End: 2020-02-03

## 2020-02-03 ENCOUNTER — HOSPITAL ENCOUNTER (OUTPATIENT)
Dept: MAMMOGRAPHY | Facility: HOSPITAL | Age: 65
Discharge: HOME | End: 2020-02-03
Admitting: FAMILY MEDICINE

## 2020-02-03 DIAGNOSIS — R92.8 ABNORMAL MAMMOGRAM: ICD-10-CM

## 2020-02-03 DIAGNOSIS — R92.8 ABNORMAL MAMMOGRAM: Primary | ICD-10-CM

## 2020-02-03 PROCEDURE — 77066 DX MAMMO INCL CAD BI: CPT

## 2020-02-03 PROCEDURE — 77066 DX MAMMO INCL CAD BI: CPT | Performed by: RADIOLOGY

## 2020-02-03 PROCEDURE — G0279 TOMOSYNTHESIS, MAMMO: HCPCS

## 2020-02-03 PROCEDURE — 77062 BREAST TOMOSYNTHESIS BI: CPT | Performed by: RADIOLOGY

## 2020-04-22 ENCOUNTER — HOSPITAL ENCOUNTER (OUTPATIENT)
Age: 65
End: 2020-04-22
Payer: COMMERCIAL

## 2020-04-22 DIAGNOSIS — R06.2: Primary | ICD-10-CM

## 2020-04-22 PROCEDURE — 71046 X-RAY EXAM CHEST 2 VIEWS: CPT

## 2020-05-19 ENCOUNTER — OFFICE VISIT (OUTPATIENT)
Dept: PREADMISSION TESTING | Facility: HOSPITAL | Age: 65
End: 2020-05-19

## 2020-05-19 PROCEDURE — C9803 HOPD COVID-19 SPEC COLLECT: HCPCS

## 2020-05-19 PROCEDURE — U0002 COVID-19 LAB TEST NON-CDC: HCPCS | Performed by: INTERNAL MEDICINE

## 2020-05-19 PROCEDURE — U0004 COV-19 TEST NON-CDC HGH THRU: HCPCS | Performed by: INTERNAL MEDICINE

## 2020-05-20 LAB
REF LAB TEST METHOD: NORMAL
SARS-COV-2 RNA RESP QL NAA+PROBE: NOT DETECTED

## 2020-05-21 ENCOUNTER — LAB REQUISITION (OUTPATIENT)
Dept: LAB | Facility: HOSPITAL | Age: 65
End: 2020-05-21

## 2020-05-21 ENCOUNTER — OUTSIDE FACILITY SERVICE (OUTPATIENT)
Dept: GASTROENTEROLOGY | Facility: CLINIC | Age: 65
End: 2020-05-21

## 2020-05-21 DIAGNOSIS — K21.00 GASTRO-ESOPHAGEAL REFLUX DISEASE WITH ESOPHAGITIS: ICD-10-CM

## 2020-05-21 PROCEDURE — 88305 TISSUE EXAM BY PATHOLOGIST: CPT | Performed by: INTERNAL MEDICINE

## 2020-05-21 PROCEDURE — 43239 EGD BIOPSY SINGLE/MULTIPLE: CPT | Performed by: INTERNAL MEDICINE

## 2020-05-22 LAB
CYTO UR: NORMAL
LAB AP CASE REPORT: NORMAL
LAB AP CLINICAL INFORMATION: NORMAL
PATH REPORT.FINAL DX SPEC: NORMAL
PATH REPORT.GROSS SPEC: NORMAL

## 2020-05-26 ENCOUNTER — TELEPHONE (OUTPATIENT)
Dept: GASTROENTEROLOGY | Facility: CLINIC | Age: 65
End: 2020-05-26

## 2020-07-15 ENCOUNTER — HOSPITAL ENCOUNTER (OUTPATIENT)
Age: 65
End: 2020-07-15
Payer: COMMERCIAL

## 2020-07-15 DIAGNOSIS — Z03.818: Primary | ICD-10-CM

## 2020-07-15 PROCEDURE — U0004 COV-19 TEST NON-CDC HGH THRU: HCPCS

## 2020-07-17 LAB — COVID-19 NASAL PCR SENDOUT LEX: NOT DETECTED

## 2020-08-04 ENCOUNTER — HOSPITAL ENCOUNTER (OUTPATIENT)
Dept: MAMMOGRAPHY | Facility: HOSPITAL | Age: 65
Discharge: HOME | End: 2020-08-04
Admitting: FAMILY MEDICINE

## 2020-08-04 DIAGNOSIS — R92.8 ABNORMAL MAMMOGRAM: ICD-10-CM

## 2020-08-04 PROCEDURE — 77065 DX MAMMO INCL CAD UNI: CPT

## 2020-08-04 PROCEDURE — 77061 BREAST TOMOSYNTHESIS UNI: CPT | Performed by: RADIOLOGY

## 2020-08-04 PROCEDURE — 77065 DX MAMMO INCL CAD UNI: CPT | Performed by: RADIOLOGY

## 2020-08-04 PROCEDURE — G0279 TOMOSYNTHESIS, MAMMO: HCPCS

## 2020-08-14 ENCOUNTER — TRANSCRIBE ORDERS (OUTPATIENT)
Dept: ADMINISTRATIVE | Facility: HOSPITAL | Age: 65
End: 2020-08-14

## 2020-08-14 DIAGNOSIS — R92.8 ABNORMAL MAMMOGRAM: Primary | ICD-10-CM

## 2020-10-07 ENCOUNTER — HOSPITAL ENCOUNTER (OUTPATIENT)
Age: 65
End: 2020-10-07
Payer: COMMERCIAL

## 2020-10-07 DIAGNOSIS — M25.551: Primary | ICD-10-CM

## 2020-10-07 PROCEDURE — 73502 X-RAY EXAM HIP UNI 2-3 VIEWS: CPT

## 2021-02-04 ENCOUNTER — HOSPITAL ENCOUNTER (OUTPATIENT)
Dept: MAMMOGRAPHY | Facility: HOSPITAL | Age: 66
Discharge: HOME | End: 2021-02-04
Admitting: FAMILY MEDICINE

## 2021-02-04 DIAGNOSIS — R92.8 ABNORMAL MAMMOGRAM: ICD-10-CM

## 2021-02-04 PROCEDURE — 77066 DX MAMMO INCL CAD BI: CPT

## 2021-02-04 PROCEDURE — 77066 DX MAMMO INCL CAD BI: CPT | Performed by: RADIOLOGY

## 2021-02-04 PROCEDURE — 77062 BREAST TOMOSYNTHESIS BI: CPT | Performed by: RADIOLOGY

## 2021-02-04 PROCEDURE — G0279 TOMOSYNTHESIS, MAMMO: HCPCS

## 2021-03-03 ENCOUNTER — OFFICE VISIT (OUTPATIENT)
Dept: ORTHOPEDIC SURGERY | Facility: CLINIC | Age: 66
End: 2021-03-03

## 2021-03-03 VITALS — HEIGHT: 62 IN | WEIGHT: 180 LBS | OXYGEN SATURATION: 94 % | HEART RATE: 64 BPM | BODY MASS INDEX: 33.13 KG/M2

## 2021-03-03 DIAGNOSIS — M17.12 PRIMARY OSTEOARTHRITIS OF LEFT KNEE: ICD-10-CM

## 2021-03-03 DIAGNOSIS — M17.11 PRIMARY OSTEOARTHRITIS OF RIGHT KNEE: Primary | ICD-10-CM

## 2021-03-03 PROCEDURE — 20610 DRAIN/INJ JOINT/BURSA W/O US: CPT | Performed by: ORTHOPAEDIC SURGERY

## 2021-03-03 PROCEDURE — 99204 OFFICE O/P NEW MOD 45 MIN: CPT | Performed by: ORTHOPAEDIC SURGERY

## 2021-03-03 RX ORDER — ROSUVASTATIN CALCIUM 5 MG/1
5 TABLET, COATED ORAL
COMMUNITY
Start: 2021-02-18

## 2021-03-03 RX ORDER — LOSARTAN POTASSIUM AND HYDROCHLOROTHIAZIDE 12.5; 5 MG/1; MG/1
1 TABLET ORAL DAILY
COMMUNITY
Start: 2021-02-23

## 2021-03-03 RX ADMIN — ROPIVACAINE HYDROCHLORIDE 4 ML: 5 INJECTION, SOLUTION EPIDURAL; INFILTRATION; PERINEURAL at 13:50

## 2021-03-03 RX ADMIN — TRIAMCINOLONE ACETONIDE 40 MG: 40 INJECTION, SUSPENSION INTRA-ARTICULAR; INTRAMUSCULAR at 13:50

## 2021-03-03 NOTE — PROGRESS NOTES
Procedure   Large Joint Arthrocentesis: R knee  Date/Time: 3/3/2021 1:50 PM  Consent given by: patient  Site marked: site marked  Timeout: Immediately prior to procedure a time out was called to verify the correct patient, procedure, equipment, support staff and site/side marked as required   Supporting Documentation  Indications: pain   Procedure Details  Location: knee - R knee  Preparation: Patient was prepped and draped in the usual sterile fashion  Needle size: 23 G  Approach: anterolateral  Medications administered: 4 mL ropivacaine 0.5 %; 40 mg triamcinolone acetonide 40 MG/ML  Patient tolerance: patient tolerated the procedure well with no immediate complications

## 2021-03-03 NOTE — PROGRESS NOTES
[facility]    Subjective     Chief Complaint   Patient presents with   • Left Knee - Pain   • Right Knee - Pain        HPI    Stella Edmonds is a 65 y.o. female who presents with new problem of bilateral knee pain. Her pain is worse in her right knee after a fall a few weeks ago.  Pain has been ongoing for 2 years, but the right knee is worse after her fall few weeks ago. She has not had an MRI.    I have reviewed the following portions of the patient's history and agree with: History of Present Illness and Review of Systems    Patient Active Problem List   Diagnosis   • Well female exam with routine gynecological exam   • Menopausal state   • Dyspareunia   • Submucous leiomyoma of uterus   • Spinal stenosis in cervical region   • Fatigue   • Abnormal EKG   • Hyperlipidemia   • Hypertension   • AVM (arteriovenous malformation)   • Migraines   • Varicose vein   • Hypothyroidism   • GERD (gastroesophageal reflux disease)   • Obesity (BMI 30.0-34.9)   • Cervical stenosis (uterine cervix)   • Abnormal uterine bleeding (AUB)   • Herniated lumbar disc without myelopathy   • Lumbar radiculopathy   • Lumbar radiculopathy, acute     Past Medical History:   Diagnosis Date   • Abnormal EKG     History of abnormal EKG: Myocardial perfusion study, 2009:  Reduced exercise tolerance, no evidence of inducible ischemia, normal LV systolic function.    • Abnormal leg movement     Probable restless leg syndrome.   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    • Back pain    • Esophageal dysmotility    • Fatigue      She reports that she is feeling less fatigued off her diuretic. Lightheadedness and dizziness; improved.   • Gastritis    • GERD (gastroesophageal reflux disease)    • Globus sensation    •  1 para 1    • Hiatal hernia    • History of hepatitis A     Age 9   • Hot flashes    • Hyperlipidemia    • Hypertension    •  Hypothyroidism    • Lower extremity edema     Mild.She did state that she does have some increase in her lower extremity edema, and Dr. Willett discussed with her that she may take hydrochlorothiazide 12.5 or half a tablet on an as needed basis.   • Migraines     Migraines, treated with propranolol.   • Obesity (BMI 30.0-34.9)     BMI 33.5   • PONV (postoperative nausea and vomiting)    • Reflux esophagitis    • Schatzki's ring    • Seizure (CMS/HCC)     last seizure - 2 years ago   • UTI (urinary tract infection)     History of UTIs   • Varicose vein     Varicosities   • Wears eyeglasses       Past Surgical History:   Procedure Laterality Date   • BREAST BIOPSY Right 07/10/2017    stereo bx   • BREAST BIOPSY Left 02/02/2018    stereo bx   • BREAST BIOPSY Right 02/2019    stereo   • COLONOSCOPY      8/2017   • ESOPHAGEAL DILATATION     • HYSTERECTOMY      total 1997   • LUMBAR DISCECTOMY Left 10/4/2017    Procedure: LUMBAR DISCECTOMY MICRO ENDOSCOPIC L5-S1 RIGHT;  Surgeon: Pancho Au MD;  Location: [facility];  Service:    • OOPHORECTOMY  1997   • OTHER SURGICAL HISTORY  08/07/2015    Prior right frontal craniotomy for Spetzler-Chucky grade I arteriovenous malformation.-DR. AU/DR. BUNDY   • OTHER SURGICAL HISTORY  2015    Frontal AV malformation with removal, Dr. Au, 2015.   • RENAL ARTERY STENT      pt denies   • UPPER GASTROINTESTINAL ENDOSCOPY  11/02/2005      Family History   Problem Relation Age of Onset   • Breast cancer Mother 34   • Arthritis Mother    • Cervical cancer Other    • Ovarian cancer Paternal Grandmother 82   • Arthritis Father      Social History     Socioeconomic History   • Marital status:      Spouse name: Not on file   • Number of children: Not on file   • Years of education: Not on file   • Highest education level: Not on file   Tobacco Use   • Smoking status: Never Smoker   • Smokeless tobacco: Never Used   Substance and Sexual Activity   • Alcohol use: No   •  Drug use: No   • Sexual activity: Never      Current Outpatient Medications on File Prior to Visit   Medication Sig Dispense Refill   • levothyroxine (SYNTHROID, LEVOTHROID) 75 MCG tablet Take 75 mcg by mouth daily.     • pantoprazole (PROTONIX) 40 MG EC tablet Take 40 mg by mouth Daily.     • propranolol (INDERAL) 20 MG tablet Take 10 mg by mouth Daily.     • losartan-hydrochlorothiazide (HYZAAR) 50-12.5 MG per tablet      • rosuvastatin (CRESTOR) 5 MG tablet      • VITAMIN D, ERGOCALCIFEROL, PO 50,000 mcg.       No current facility-administered medications on file prior to visit.       Allergies   Allergen Reactions   • Codeine      n/v   • Lortab [Hydrocodone-Acetaminophen]      n/v   • Morphine And Related      Passed out   • Orange Fruit [Citrus]      vomiting   • Other      History of possible seizures, patient felt related to orange dye.   Most pain meds lead to GI upset/vomiting.  Orange Dye        Review of Systems   Constitutional: Negative for activity change, appetite change, chills, diaphoresis, fatigue, fever and unexpected weight change.   HENT: Positive for sinus pressure. Negative for congestion, dental problem, drooling, ear discharge, ear pain, facial swelling, hearing loss, mouth sores, nosebleeds, postnasal drip, rhinorrhea, sneezing, sore throat, tinnitus, trouble swallowing and voice change.    Eyes: Negative for photophobia, pain, discharge, redness, itching and visual disturbance.   Respiratory: Negative for apnea, cough, choking, chest tightness, shortness of breath, wheezing and stridor.    Cardiovascular: Positive for leg swelling. Negative for chest pain and palpitations.   Gastrointestinal: Negative for abdominal distention, abdominal pain, anal bleeding, blood in stool, constipation, diarrhea, nausea, rectal pain and vomiting.   Endocrine: Negative for cold intolerance, heat intolerance, polydipsia, polyphagia and polyuria.   Genitourinary: Negative for decreased urine volume,  "difficulty urinating, dysuria, enuresis, flank pain, frequency, genital sores, hematuria and urgency.   Musculoskeletal: Positive for arthralgias. Negative for back pain, gait problem, joint swelling, myalgias, neck pain and neck stiffness.   Skin: Negative for color change, pallor, rash and wound.   Allergic/Immunologic: Positive for food allergies. Negative for environmental allergies and immunocompromised state.   Neurological: Positive for headaches. Negative for dizziness, tremors, seizures, syncope, facial asymmetry, speech difficulty, weakness, light-headedness and numbness.   Hematological: Negative for adenopathy. Does not bruise/bleed easily.   Psychiatric/Behavioral: Negative for agitation, behavioral problems, confusion, decreased concentration, dysphoric mood, hallucinations, self-injury, sleep disturbance and suicidal ideas. The patient is not nervous/anxious and is not hyperactive.         Objective      Physical Exam  Pulse 64   Ht 157.5 cm (62.01\")   Wt 81.6 kg (180 lb)   SpO2 94%   BMI 32.91 kg/m²     Body mass index is 32.91 kg/m².    General:   Mental Status:  Alert   Appearance: Cooperative, in no acute distress   Build and Nutrition: Overweight female   Orientation: Alert and oriented to person, place and time   Posture: Normal   Gait: Normal     Integument       • Right knee: No skin lesions, rash, or ecchymosis.       • Left knee: No skin lesions, rash, or ecchymosis.    Neurologic  • Sensation:       • Right foot:  Intact to light touch on the dorsal and plantar aspects       • Left foot:  Intact to light touch on the dorsal and plantar aspects    Motor       • Right lower extremity: 5/5 quadriceps, hamstrings, ankle dorsiflexors, and ankle plantar flexors        • Left lower extremity: 5/5 quadriceps, hamstrings, ankle dorsiflexors, and ankle plantar flexors     Vascular       • Right lower extremity: 2+ dorsalis pedis pulse. Prompt capillary refill.       • Left lower extremity: 2+ " dorsalis pedis pulse. Prompt capillary refill.    Lower Extremities  • Right Knee:        • Tenderness: Medial and lateral joint-line tenderness        • Swelling: None        • Effusion: None       • Crepitus: Positive       • Atrophy: None       • Range of motion:            • Extension: 0°            • Flexion: 120°        • Instability: No varus or valgus laxity. Negative anterior drawer.       • Deformities: None    • Left Knee:        • Tenderness: Medial joint-line tenderness       • Swelling: None        • Effusion: None       • Crepitus: Positive       • Atrophy: None       • Range of motion:            • Extension: 0°            • Flexion: 120°        • Instability: No varus or valgus laxity. Negative anterior drawer.       • Deformities: None    Imaging/Studies      Imaging Results (Last 24 Hours)     Procedure Component Value Units Date/Time    XR Knee 4+ View Bilateral [[id number]] Resulted: 03/03/21 1342     Updated: 03/03/21 1343    Narrative:      Left Knee Radiographs  Indication: left knee pain  Views: Standing AP's and skiers of both knees, with lateral and sunrise   views of the left knee    Comparison: no prior studies available    Findings:   Bone-on-bone contact medial compartment, tricompartmental osteophytes,   varus alignment, patellofemoral degeneration. No acute bony abnormalities.   No unusual bony features.    Right Knee Radiographs  Indication: right knee pain  Views: Standing AP's and skiers of both knees, with lateral and sunrise   views of the right knee    Comparison: no prior studies available    Findings:   Medial joint space narrowing, patellofemoral degeneration. No acute bony   abnormalities. No unusual bony features.          Assessment and Plan     Diagnoses and all orders for this visit:    1. Primary osteoarthritis of right knee (Primary)  -     Cancel: XR Knee 4+ View Left  -     XR Knee 4+ View Bilateral  -     Large Joint Arthrocentesis: R knee    2. Primary  osteoarthritis of left knee  -     XR Knee 4+ View Bilateral        1. Primary osteoarthritis of right knee    2. Primary osteoarthritis of left knee        We discussed treatment options, and she would like to try an intra-articular injection for her right knee.  Left knee is minimally symptomatic.  Injection was provided today.    Procedure Note:   The potential benefits of performing a therapeutic right knee joint injection, as well as potential risks (including, but not limited to infection, swelling, pain, bleeding, bruising, nerve/blood vessel damage, skin color changes, transient elevation in blood glucose levels, and fat atrophy) were discussed with the patient.  After informed consent was obtained, a timeout procedure was performed, and the skin on the right knee was prepped with chlorhexidine soap and alcohol, after which ethyl chloride was applied to the skin at the injection site. Via the anterolateral approach, 1 ml of Kenalog 40 mg/ml mixed with 4 ml 0.5% ropivacaine plain was injected into the knee joint.  The patient tolerated the procedure well, experiencing 75% improvement a few minutes following the injection. There were no complications. A Band-Aid was applied to the injection site. Post-procedural instructions were given to the patient and/or their caregiver.    Return in about 3 months (around 6/3/2021).      Scribed for Fransisco Abarca MD by ROLAN WILDER.  03/04/21   10:21 EST    I have personally performed the services described in this document as scribed by the above individual, and it is both accurate and complete.  Fransisco Abarca MD  3/4/2021  13:09 EST

## 2021-03-04 ENCOUNTER — TELEPHONE (OUTPATIENT)
Dept: ORTHOPEDIC SURGERY | Facility: CLINIC | Age: 66
End: 2021-03-04

## 2021-03-04 RX ORDER — ROPIVACAINE HYDROCHLORIDE 5 MG/ML
4 INJECTION, SOLUTION EPIDURAL; INFILTRATION; PERINEURAL
Status: COMPLETED | OUTPATIENT
Start: 2021-03-03 | End: 2021-03-03

## 2021-03-04 RX ORDER — TRIAMCINOLONE ACETONIDE 40 MG/ML
40 INJECTION, SUSPENSION INTRA-ARTICULAR; INTRAMUSCULAR
Status: COMPLETED | OUTPATIENT
Start: 2021-03-03 | End: 2021-03-03

## 2021-03-04 NOTE — TELEPHONE ENCOUNTER
Patient would like to know if she can wrap her right knee in an ace bandage. She received a cortisone injection in it yesterday. She can be reached at [phone].

## 2021-03-04 NOTE — TELEPHONE ENCOUNTER
Called patient. Let her know that it would be fine to wear ace wrap if it made her more comfortable. She understood.     Erika

## 2021-03-17 ENCOUNTER — HOSPITAL ENCOUNTER (OUTPATIENT)
Age: 66
End: 2021-03-17
Payer: COMMERCIAL

## 2021-03-17 DIAGNOSIS — Z98.890: ICD-10-CM

## 2021-03-17 DIAGNOSIS — R07.9: Primary | ICD-10-CM

## 2021-03-17 DIAGNOSIS — I10: ICD-10-CM

## 2021-03-17 DIAGNOSIS — Z87.19: ICD-10-CM

## 2021-03-17 PROCEDURE — 78452 HT MUSCLE IMAGE SPECT MULT: CPT

## 2021-03-17 PROCEDURE — 93306 TTE W/DOPPLER COMPLETE: CPT

## 2021-03-17 PROCEDURE — 93017 CV STRESS TEST TRACING ONLY: CPT

## 2021-03-17 PROCEDURE — A9502 TC99M TETROFOSMIN: HCPCS

## 2021-03-19 ENCOUNTER — TELEPHONE (OUTPATIENT)
Dept: GASTROENTEROLOGY | Facility: CLINIC | Age: 66
End: 2021-03-19

## 2021-03-19 NOTE — TELEPHONE ENCOUNTER
Patient called, she states the protonix is not working and that she is having terrible heartburn. Per chart review patient has been on Protonix for while and famotidine.

## 2021-03-22 ENCOUNTER — APPOINTMENT (OUTPATIENT)
Dept: PREADMISSION TESTING | Facility: HOSPITAL | Age: 66
End: 2021-03-22

## 2021-03-23 ENCOUNTER — HOSPITAL ENCOUNTER (OUTPATIENT)
Age: 66
End: 2021-03-23
Payer: COMMERCIAL

## 2021-03-23 DIAGNOSIS — Z12.11 SCREENING FOR COLON CANCER: Primary | ICD-10-CM

## 2021-03-23 DIAGNOSIS — Z20.822: Primary | ICD-10-CM

## 2021-03-23 PROCEDURE — U0003 INFECTIOUS AGENT DETECTION BY NUCLEIC ACID (DNA OR RNA); SEVERE ACUTE RESPIRATORY SYNDROME CORONAVIRUS 2 (SARS-COV-2) (CORONAVIRUS DISEASE [COVID-19]), AMPLIFIED PROBE TECHNIQUE, MAKING USE OF HIGH THROUGHPUT TECHNOLOGIES AS DESCRIBED BY CMS-2020-01-R: HCPCS

## 2021-03-23 RX ORDER — ESOMEPRAZOLE MAGNESIUM 40 MG/1
40 CAPSULE, DELAYED RELEASE ORAL
Qty: 90 CAPSULE | Refills: 3 | Status: SHIPPED | OUTPATIENT
Start: 2021-03-23 | End: 2023-02-07

## 2021-03-25 ENCOUNTER — OUTSIDE FACILITY SERVICE (OUTPATIENT)
Dept: GASTROENTEROLOGY | Facility: CLINIC | Age: 66
End: 2021-03-25

## 2021-03-25 PROCEDURE — 43235 EGD DIAGNOSTIC BRUSH WASH: CPT | Performed by: INTERNAL MEDICINE

## 2021-05-01 ENCOUNTER — HOSPITAL ENCOUNTER (EMERGENCY)
Age: 66
Discharge: HOME | End: 2021-05-01
Payer: COMMERCIAL

## 2021-05-01 VITALS
RESPIRATION RATE: 19 BRPM | TEMPERATURE: 98.6 F | DIASTOLIC BLOOD PRESSURE: 96 MMHG | HEART RATE: 76 BPM | SYSTOLIC BLOOD PRESSURE: 149 MMHG | OXYGEN SATURATION: 98 %

## 2021-05-01 VITALS — BODY MASS INDEX: 32.9 KG/M2

## 2021-05-01 VITALS
OXYGEN SATURATION: 98 % | RESPIRATION RATE: 19 BRPM | SYSTOLIC BLOOD PRESSURE: 149 MMHG | DIASTOLIC BLOOD PRESSURE: 96 MMHG | HEART RATE: 76 BPM | TEMPERATURE: 98.6 F

## 2021-05-01 DIAGNOSIS — Z88.5: ICD-10-CM

## 2021-05-01 DIAGNOSIS — G43.909: Primary | ICD-10-CM

## 2021-05-01 DIAGNOSIS — E78.5: ICD-10-CM

## 2021-05-01 DIAGNOSIS — Z88.2: ICD-10-CM

## 2021-05-01 DIAGNOSIS — I10: ICD-10-CM

## 2021-05-01 PROCEDURE — G0463 HOSPITAL OUTPT CLINIC VISIT: HCPCS

## 2021-05-01 PROCEDURE — 96372 THER/PROPH/DIAG INJ SC/IM: CPT

## 2021-05-01 PROCEDURE — 99202 OFFICE O/P NEW SF 15 MIN: CPT

## 2021-10-29 ENCOUNTER — ANESTHESIA EVENT (OUTPATIENT)
Dept: PERIOP | Facility: HOSPITAL | Age: 66
End: 2021-10-29

## 2021-10-29 ENCOUNTER — PRE-ADMISSION TESTING (OUTPATIENT)
Dept: PREADMISSION TESTING | Facility: HOSPITAL | Age: 66
End: 2021-10-29

## 2021-10-29 ENCOUNTER — HOSPITAL ENCOUNTER (OUTPATIENT)
Dept: GENERAL RADIOLOGY | Facility: HOSPITAL | Age: 66
Discharge: HOME | End: 2021-10-29

## 2021-10-29 VITALS — WEIGHT: 183.8 LBS | HEIGHT: 62 IN | BODY MASS INDEX: 33.82 KG/M2

## 2021-10-29 LAB
ABO GROUP BLD: NORMAL
ALBUMIN SERPL-MCNC: 4.2 G/DL (ref 3.5–5.2)
ALBUMIN/GLOB SERPL: 1.8 G/DL
ALP SERPL-CCNC: 90 U/L (ref 39–117)
ALT SERPL W P-5'-P-CCNC: 11 U/L (ref 1–33)
ANION GAP SERPL CALCULATED.3IONS-SCNC: 11 MMOL/L (ref 5–15)
AST SERPL-CCNC: 14 U/L (ref 1–32)
BASOPHILS # BLD AUTO: 0.06 10*3/MM3 (ref 0–0.2)
BASOPHILS NFR BLD AUTO: 0.8 % (ref 0–1.5)
BILIRUB SERPL-MCNC: 0.5 MG/DL (ref 0–1.2)
BUN SERPL-MCNC: 18 MG/DL (ref 8–23)
BUN/CREAT SERPL: 25.4 (ref 7–25)
CALCIUM SPEC-SCNC: 9.5 MG/DL (ref 8.6–10.5)
CHLORIDE SERPL-SCNC: 101 MMOL/L (ref 98–107)
CO2 SERPL-SCNC: 27 MMOL/L (ref 22–29)
CREAT SERPL-MCNC: 0.71 MG/DL (ref 0.57–1)
CRP SERPL-MCNC: 1.05 MG/DL (ref 0–0.5)
DEPRECATED RDW RBC AUTO: 45.6 FL (ref 37–54)
DEPRECATED RDW RBC AUTO: 46.1 FL (ref 37–54)
EOSINOPHIL # BLD AUTO: 0.07 10*3/MM3 (ref 0–0.4)
EOSINOPHIL NFR BLD AUTO: 0.9 % (ref 0.3–6.2)
ERYTHROCYTE [DISTWIDTH] IN BLOOD BY AUTOMATED COUNT: 13 % (ref 12.3–15.4)
ERYTHROCYTE [DISTWIDTH] IN BLOOD BY AUTOMATED COUNT: 13.2 % (ref 12.3–15.4)
ERYTHROCYTE [SEDIMENTATION RATE] IN BLOOD: 11 MM/HR (ref 0–30)
GFR SERPL CREATININE-BSD FRML MDRD: 83 ML/MIN/1.73
GLOBULIN UR ELPH-MCNC: 2.3 GM/DL
GLUCOSE SERPL-MCNC: 94 MG/DL (ref 65–99)
HBA1C MFR BLD: 4.9 % (ref 4.8–5.6)
HCT VFR BLD AUTO: 40.1 % (ref 34–46.6)
HCT VFR BLD AUTO: 40.3 % (ref 34–46.6)
HGB BLD-MCNC: 13.2 G/DL (ref 12–15.9)
HGB BLD-MCNC: 13.3 G/DL (ref 12–15.9)
IMM GRANULOCYTES # BLD AUTO: 0.02 10*3/MM3 (ref 0–0.05)
IMM GRANULOCYTES NFR BLD AUTO: 0.3 % (ref 0–0.5)
LYMPHOCYTES # BLD AUTO: 1.33 10*3/MM3 (ref 0.7–3.1)
LYMPHOCYTES NFR BLD AUTO: 17.4 % (ref 19.6–45.3)
MCH RBC QN AUTO: 31.4 PG (ref 26.6–33)
MCH RBC QN AUTO: 31.4 PG (ref 26.6–33)
MCHC RBC AUTO-ENTMCNC: 32.9 G/DL (ref 31.5–35.7)
MCHC RBC AUTO-ENTMCNC: 33 G/DL (ref 31.5–35.7)
MCV RBC AUTO: 95 FL (ref 79–97)
MCV RBC AUTO: 95.5 FL (ref 79–97)
MONOCYTES # BLD AUTO: 0.56 10*3/MM3 (ref 0.1–0.9)
MONOCYTES NFR BLD AUTO: 7.3 % (ref 5–12)
NEUTROPHILS NFR BLD AUTO: 5.62 10*3/MM3 (ref 1.7–7)
NEUTROPHILS NFR BLD AUTO: 73.3 % (ref 42.7–76)
NRBC BLD AUTO-RTO: 0 /100 WBC (ref 0–0.2)
PLATELET # BLD AUTO: 262 10*3/MM3 (ref 140–450)
PLATELET # BLD AUTO: 274 10*3/MM3 (ref 140–450)
PMV BLD AUTO: 10 FL (ref 6–12)
PMV BLD AUTO: 10.8 FL (ref 6–12)
POTASSIUM SERPL-SCNC: 4 MMOL/L (ref 3.5–5.2)
PROT SERPL-MCNC: 6.5 G/DL (ref 6–8.5)
QT INTERVAL: 398 MS
QTC INTERVAL: 407 MS
RBC # BLD AUTO: 4.2 10*6/MM3 (ref 3.77–5.28)
RBC # BLD AUTO: 4.24 10*6/MM3 (ref 3.77–5.28)
RH BLD: POSITIVE
SODIUM SERPL-SCNC: 139 MMOL/L (ref 136–145)
WBC # BLD AUTO: 7.66 10*3/MM3 (ref 3.4–10.8)
WBC # BLD AUTO: 7.99 10*3/MM3 (ref 3.4–10.8)

## 2021-10-29 PROCEDURE — 86140 C-REACTIVE PROTEIN: CPT

## 2021-10-29 PROCEDURE — 93005 ELECTROCARDIOGRAM TRACING: CPT

## 2021-10-29 PROCEDURE — 86901 BLOOD TYPING SEROLOGIC RH(D): CPT

## 2021-10-29 PROCEDURE — 36415 COLL VENOUS BLD VENIPUNCTURE: CPT

## 2021-10-29 PROCEDURE — 93010 ELECTROCARDIOGRAM REPORT: CPT | Performed by: INTERNAL MEDICINE

## 2021-10-29 PROCEDURE — 80053 COMPREHEN METABOLIC PANEL: CPT

## 2021-10-29 PROCEDURE — 71046 X-RAY EXAM CHEST 2 VIEWS: CPT

## 2021-10-29 PROCEDURE — 83036 HEMOGLOBIN GLYCOSYLATED A1C: CPT

## 2021-10-29 PROCEDURE — 85027 COMPLETE CBC AUTOMATED: CPT

## 2021-10-29 PROCEDURE — 85652 RBC SED RATE AUTOMATED: CPT

## 2021-10-29 PROCEDURE — 86900 BLOOD TYPING SEROLOGIC ABO: CPT

## 2021-10-29 PROCEDURE — 85025 COMPLETE CBC W/AUTO DIFF WBC: CPT

## 2021-10-29 RX ORDER — MELOXICAM 15 MG/1
15 TABLET ORAL DAILY
COMMUNITY

## 2021-10-29 RX ORDER — FLUCONAZOLE 150 MG/1
150 TABLET ORAL ONCE
COMMUNITY
End: 2021-11-09 | Stop reason: HOSPADM

## 2021-10-29 RX ORDER — OMEPRAZOLE 40 MG/1
40 CAPSULE, DELAYED RELEASE ORAL DAILY
Status: ON HOLD | COMMUNITY
End: 2021-11-08

## 2021-10-29 ASSESSMENT — KOOS JR
KOOS JR SCORE: 47.487
KOOS JR SCORE: 16

## 2021-11-05 ENCOUNTER — APPOINTMENT (OUTPATIENT)
Dept: PREADMISSION TESTING | Facility: HOSPITAL | Age: 66
End: 2021-11-05

## 2021-11-05 PROCEDURE — U0004 COV-19 TEST NON-CDC HGH THRU: HCPCS | Performed by: ORTHOPAEDIC SURGERY

## 2021-11-05 RX ORDER — FAMOTIDINE 10 MG/ML
20 INJECTION, SOLUTION INTRAVENOUS ONCE
Status: CANCELLED | OUTPATIENT
Start: 2021-11-05 | End: 2021-11-05

## 2021-11-06 LAB — SARS-COV-2 RNA PNL SPEC NAA+PROBE: NOT DETECTED

## 2021-11-08 ENCOUNTER — ANESTHESIA EVENT CONVERTED (OUTPATIENT)
Dept: ANESTHESIOLOGY | Facility: HOSPITAL | Age: 66
End: 2021-11-08

## 2021-11-08 ENCOUNTER — HOSPITAL ENCOUNTER (OUTPATIENT)
Facility: HOSPITAL | Age: 66
Discharge: HOME | End: 2021-11-09
Attending: ORTHOPAEDIC SURGERY | Admitting: ORTHOPAEDIC SURGERY

## 2021-11-08 ENCOUNTER — APPOINTMENT (OUTPATIENT)
Dept: GENERAL RADIOLOGY | Facility: HOSPITAL | Age: 66
End: 2021-11-08

## 2021-11-08 ENCOUNTER — ANESTHESIA (OUTPATIENT)
Dept: PERIOP | Facility: HOSPITAL | Age: 66
End: 2021-11-08

## 2021-11-08 DIAGNOSIS — Z96.651 S/P TOTAL KNEE ARTHROPLASTY, RIGHT: Primary | ICD-10-CM

## 2021-11-08 DIAGNOSIS — M17.11 ARTHRITIS OF RIGHT KNEE: ICD-10-CM

## 2021-11-08 LAB
ABO GROUP BLD: NORMAL
BLD GP AB SCN SERPL QL: NEGATIVE
GLUCOSE BLDC GLUCOMTR-MCNC: 181 MG/DL (ref 70–130)
POTASSIUM SERPL-SCNC: 3.9 MMOL/L (ref 3.5–5.2)
RH BLD: POSITIVE
T&S EXPIRATION DATE: NORMAL

## 2021-11-08 PROCEDURE — 0 CEFAZOLIN IN DEXTROSE 2-4 GM/100ML-% SOLUTION: Performed by: ORTHOPAEDIC SURGERY

## 2021-11-08 PROCEDURE — 25010000002 ROPIVACINE HCL-NACL: Performed by: NURSE ANESTHETIST, CERTIFIED REGISTERED

## 2021-11-08 PROCEDURE — 25010000002 DEXAMETHASONE PER 1 MG: Performed by: NURSE ANESTHETIST, CERTIFIED REGISTERED

## 2021-11-08 PROCEDURE — 86850 RBC ANTIBODY SCREEN: CPT | Performed by: ORTHOPAEDIC SURGERY

## 2021-11-08 PROCEDURE — 27447 TOTAL KNEE ARTHROPLASTY: CPT

## 2021-11-08 PROCEDURE — C1713 ANCHOR/SCREW BN/BN,TIS/BN: HCPCS | Performed by: ORTHOPAEDIC SURGERY

## 2021-11-08 PROCEDURE — 94799 UNLISTED PULMONARY SVC/PX: CPT

## 2021-11-08 PROCEDURE — C1755 CATHETER, INTRASPINAL: HCPCS | Performed by: ORTHOPAEDIC SURGERY

## 2021-11-08 PROCEDURE — 84132 ASSAY OF SERUM POTASSIUM: CPT | Performed by: ANESTHESIOLOGY

## 2021-11-08 PROCEDURE — 97161 PT EVAL LOW COMPLEX 20 MIN: CPT

## 2021-11-08 PROCEDURE — 25010000002 PROPOFOL 10 MG/ML EMULSION: Performed by: NURSE ANESTHETIST, CERTIFIED REGISTERED

## 2021-11-08 PROCEDURE — 25010000002 ROPIVACAINE PER 1 MG: Performed by: ORTHOPAEDIC SURGERY

## 2021-11-08 PROCEDURE — 86923 COMPATIBILITY TEST ELECTRIC: CPT

## 2021-11-08 PROCEDURE — C1776 JOINT DEVICE (IMPLANTABLE): HCPCS | Performed by: ORTHOPAEDIC SURGERY

## 2021-11-08 PROCEDURE — 97116 GAIT TRAINING THERAPY: CPT

## 2021-11-08 PROCEDURE — 86900 BLOOD TYPING SEROLOGIC ABO: CPT | Performed by: ORTHOPAEDIC SURGERY

## 2021-11-08 PROCEDURE — 73560 X-RAY EXAM OF KNEE 1 OR 2: CPT

## 2021-11-08 PROCEDURE — 25010000002 ONDANSETRON PER 1 MG: Performed by: NURSE ANESTHETIST, CERTIFIED REGISTERED

## 2021-11-08 PROCEDURE — 86901 BLOOD TYPING SEROLOGIC RH(D): CPT | Performed by: ORTHOPAEDIC SURGERY

## 2021-11-08 PROCEDURE — 82962 GLUCOSE BLOOD TEST: CPT

## 2021-11-08 PROCEDURE — C1889 IMPLANT/INSERT DEVICE, NOC: HCPCS | Performed by: ORTHOPAEDIC SURGERY

## 2021-11-08 DEVICE — LEGION NARROW CRUCIATE RETAINING                                    OXINIUM SIZE 4N RIGHT
Type: IMPLANTABLE DEVICE | Site: KNEE | Status: FUNCTIONAL
Brand: LEGION

## 2021-11-08 DEVICE — GENESIS II NON-POROUS TIBIAL                                    BASEPLATE SIZE 2 RIGHT
Type: IMPLANTABLE DEVICE | Site: KNEE | Status: FUNCTIONAL
Brand: GENESIS II

## 2021-11-08 DEVICE — DEV CONTRL TISS STRATAFIX SYMM PDS PLUS VIL CT-1 45CM: Type: IMPLANTABLE DEVICE | Site: KNEE | Status: FUNCTIONAL

## 2021-11-08 DEVICE — PALACOS® R IS A FAST-CURING, RADIOPAQUE, POLY(METHYL METHACRYLATE)-BASED BONE CEMENT.PALACOS ® R CONTAINS THE X-RAY CONTRAST MEDIUM ZIRCONIUM DIOXIDE. TO IMPROVE VISIBILITY IN THE SURGICAL FIELD PALACOS ® R HAS BEEN COLOURED WITH CHLOROPHYLL (E141). THE BONE CEMENT IS PREPARED DIRECTLY BEFORE USE BY MIXING A POLYMER POWDER COMPONENT WITH A LIQUID MONOMER COMPONENT. A DUCTILE DOUGH FORMS WHICH CURES WITHIN A FEW MINUTES.
Type: IMPLANTABLE DEVICE | Site: KNEE | Status: FUNCTIONAL
Brand: PALACOS®

## 2021-11-08 DEVICE — GENESIS II RESURFACING PATELLAR                                    PROSTHESIS  29MM
Type: IMPLANTABLE DEVICE | Site: KNEE | Status: FUNCTIONAL
Brand: GENESIS II

## 2021-11-08 DEVICE — LEGION CRUCIATE RETAINING HIGH                                    FLEX HIGHLY CROSS LINKED                                    POLYETHYLENE SIZE 1-2 9MM
Type: IMPLANTABLE DEVICE | Site: KNEE | Status: FUNCTIONAL
Brand: LEGION

## 2021-11-08 DEVICE — IMPLANTABLE DEVICE: Type: IMPLANTABLE DEVICE | Site: KNEE | Status: FUNCTIONAL

## 2021-11-08 RX ORDER — BUPIVACAINE HYDROCHLORIDE 5 MG/ML
INJECTION, SOLUTION PERINEURAL
Status: COMPLETED | OUTPATIENT
Start: 2021-11-08 | End: 2021-11-08

## 2021-11-08 RX ORDER — ONDANSETRON 2 MG/ML
INJECTION INTRAMUSCULAR; INTRAVENOUS AS NEEDED
Status: DISCONTINUED | OUTPATIENT
Start: 2021-11-08 | End: 2021-11-08 | Stop reason: SURG

## 2021-11-08 RX ORDER — SODIUM CHLORIDE 0.9 % (FLUSH) 0.9 %
3 SYRINGE (ML) INJECTION EVERY 12 HOURS SCHEDULED
Status: DISCONTINUED | OUTPATIENT
Start: 2021-11-08 | End: 2021-11-09 | Stop reason: HOSPADM

## 2021-11-08 RX ORDER — LIDOCAINE HYDROCHLORIDE 10 MG/ML
INJECTION, SOLUTION EPIDURAL; INFILTRATION; INTRACAUDAL; PERINEURAL AS NEEDED
Status: DISCONTINUED | OUTPATIENT
Start: 2021-11-08 | End: 2021-11-08 | Stop reason: SURG

## 2021-11-08 RX ORDER — PROPRANOLOL HYDROCHLORIDE 20 MG/1
20 TABLET ORAL DAILY
Status: DISCONTINUED | OUTPATIENT
Start: 2021-11-09 | End: 2021-11-09 | Stop reason: HOSPADM

## 2021-11-08 RX ORDER — ONDANSETRON 4 MG/1
4 TABLET, FILM COATED ORAL EVERY 6 HOURS PRN
Status: DISCONTINUED | OUTPATIENT
Start: 2021-11-08 | End: 2021-11-09 | Stop reason: HOSPADM

## 2021-11-08 RX ORDER — SODIUM CHLORIDE 0.9 % (FLUSH) 0.9 %
3-10 SYRINGE (ML) INJECTION AS NEEDED
Status: DISCONTINUED | OUTPATIENT
Start: 2021-11-08 | End: 2021-11-09 | Stop reason: HOSPADM

## 2021-11-08 RX ORDER — HYDROMORPHONE HYDROCHLORIDE 1 MG/ML
0.5 INJECTION, SOLUTION INTRAMUSCULAR; INTRAVENOUS; SUBCUTANEOUS
Status: DISCONTINUED | OUTPATIENT
Start: 2021-11-08 | End: 2021-11-09 | Stop reason: HOSPADM

## 2021-11-08 RX ORDER — ACETAMINOPHEN 500 MG
1000 TABLET ORAL EVERY 8 HOURS
Qty: 42 TABLET | Refills: 0
Start: 2021-11-08 | End: 2021-11-15

## 2021-11-08 RX ORDER — OXYCODONE HYDROCHLORIDE 5 MG/1
5 TABLET ORAL EVERY 4 HOURS PRN
Status: DISCONTINUED | OUTPATIENT
Start: 2021-11-08 | End: 2021-11-09 | Stop reason: HOSPADM

## 2021-11-08 RX ORDER — ASPIRIN 325 MG
325 TABLET ORAL DAILY
Status: DISCONTINUED | OUTPATIENT
Start: 2021-11-09 | End: 2021-11-09 | Stop reason: HOSPADM

## 2021-11-08 RX ORDER — MIDAZOLAM HYDROCHLORIDE 1 MG/ML
1 INJECTION INTRAMUSCULAR; INTRAVENOUS
Status: DISCONTINUED | OUTPATIENT
Start: 2021-11-08 | End: 2021-11-08 | Stop reason: HOSPADM

## 2021-11-08 RX ORDER — LIDOCAINE HYDROCHLORIDE 10 MG/ML
0.5 INJECTION, SOLUTION EPIDURAL; INFILTRATION; INTRACAUDAL; PERINEURAL ONCE AS NEEDED
Status: COMPLETED | OUTPATIENT
Start: 2021-11-08 | End: 2021-11-08

## 2021-11-08 RX ORDER — DEXAMETHASONE SODIUM PHOSPHATE 4 MG/ML
INJECTION, SOLUTION INTRA-ARTICULAR; INTRALESIONAL; INTRAMUSCULAR; INTRAVENOUS; SOFT TISSUE AS NEEDED
Status: DISCONTINUED | OUTPATIENT
Start: 2021-11-08 | End: 2021-11-08 | Stop reason: SURG

## 2021-11-08 RX ORDER — LABETALOL HYDROCHLORIDE 5 MG/ML
10 INJECTION, SOLUTION INTRAVENOUS EVERY 4 HOURS PRN
Status: DISCONTINUED | OUTPATIENT
Start: 2021-11-08 | End: 2021-11-09 | Stop reason: HOSPADM

## 2021-11-08 RX ORDER — LABETALOL HYDROCHLORIDE 5 MG/ML
5 INJECTION, SOLUTION INTRAVENOUS
Status: DISCONTINUED | OUTPATIENT
Start: 2021-11-08 | End: 2021-11-08 | Stop reason: HOSPADM

## 2021-11-08 RX ORDER — SODIUM CHLORIDE 9 MG/ML
150 INJECTION, SOLUTION INTRAVENOUS CONTINUOUS
Status: DISCONTINUED | OUTPATIENT
Start: 2021-11-08 | End: 2021-11-09 | Stop reason: HOSPADM

## 2021-11-08 RX ORDER — HYDROCODONE BITARTRATE AND ACETAMINOPHEN 5; 325 MG/1; MG/1
1 TABLET ORAL EVERY 4 HOURS PRN
Status: DISCONTINUED | OUTPATIENT
Start: 2021-11-08 | End: 2021-11-09 | Stop reason: HOSPADM

## 2021-11-08 RX ORDER — ASPIRIN 325 MG
325 TABLET, DELAYED RELEASE (ENTERIC COATED) ORAL DAILY
Qty: 30 TABLET | Refills: 0 | Status: SHIPPED | OUTPATIENT
Start: 2021-11-09 | End: 2021-12-09

## 2021-11-08 RX ORDER — PROMETHAZINE HYDROCHLORIDE 12.5 MG/1
12.5 TABLET ORAL EVERY 6 HOURS PRN
Status: DISCONTINUED | OUTPATIENT
Start: 2021-11-08 | End: 2021-11-09 | Stop reason: HOSPADM

## 2021-11-08 RX ORDER — PANTOPRAZOLE SODIUM 40 MG/1
40 TABLET, DELAYED RELEASE ORAL EVERY MORNING
Refills: 3 | Status: DISCONTINUED | OUTPATIENT
Start: 2021-11-08 | End: 2021-11-09 | Stop reason: HOSPADM

## 2021-11-08 RX ORDER — DOCUSATE SODIUM 100 MG/1
100 CAPSULE, LIQUID FILLED ORAL 2 TIMES DAILY
Qty: 30 CAPSULE | Refills: 0 | Status: SHIPPED | OUTPATIENT
Start: 2021-11-08 | End: 2021-11-23

## 2021-11-08 RX ORDER — NALOXONE HCL 0.4 MG/ML
0.1 VIAL (ML) INJECTION
Status: DISCONTINUED | OUTPATIENT
Start: 2021-11-08 | End: 2021-11-09 | Stop reason: HOSPADM

## 2021-11-08 RX ORDER — SODIUM CHLORIDE 0.9 % (FLUSH) 0.9 %
10 SYRINGE (ML) INJECTION AS NEEDED
Status: DISCONTINUED | OUTPATIENT
Start: 2021-11-08 | End: 2021-11-08 | Stop reason: HOSPADM

## 2021-11-08 RX ORDER — POLYETHYLENE GLYCOL 3350 17 G/17G
17 POWDER, FOR SOLUTION ORAL DAILY
Status: DISCONTINUED | OUTPATIENT
Start: 2021-11-08 | End: 2021-11-09 | Stop reason: HOSPADM

## 2021-11-08 RX ORDER — ONDANSETRON 2 MG/ML
4 INJECTION INTRAMUSCULAR; INTRAVENOUS EVERY 6 HOURS PRN
Status: DISCONTINUED | OUTPATIENT
Start: 2021-11-08 | End: 2021-11-09 | Stop reason: HOSPADM

## 2021-11-08 RX ORDER — BUPIVACAINE HYDROCHLORIDE 2.5 MG/ML
INJECTION, SOLUTION EPIDURAL; INFILTRATION; INTRACAUDAL
Status: COMPLETED | OUTPATIENT
Start: 2021-11-08 | End: 2021-11-08

## 2021-11-08 RX ORDER — CEFAZOLIN SODIUM 2 G/100ML
2 INJECTION, SOLUTION INTRAVENOUS ONCE
Status: COMPLETED | OUTPATIENT
Start: 2021-11-08 | End: 2021-11-08

## 2021-11-08 RX ORDER — TRAMADOL HYDROCHLORIDE 50 MG/1
50 TABLET ORAL EVERY 6 HOURS PRN
Qty: 40 TABLET | Refills: 0 | Status: SHIPPED | OUTPATIENT
Start: 2021-11-08

## 2021-11-08 RX ORDER — FAMOTIDINE 20 MG/1
20 TABLET, FILM COATED ORAL ONCE
Status: COMPLETED | OUTPATIENT
Start: 2021-11-08 | End: 2021-11-08

## 2021-11-08 RX ORDER — FENTANYL CITRATE 50 UG/ML
50 INJECTION, SOLUTION INTRAMUSCULAR; INTRAVENOUS
Status: DISCONTINUED | OUTPATIENT
Start: 2021-11-08 | End: 2021-11-08 | Stop reason: HOSPADM

## 2021-11-08 RX ORDER — ACETAMINOPHEN 500 MG
1000 TABLET ORAL EVERY 8 HOURS
Status: DISCONTINUED | OUTPATIENT
Start: 2021-11-08 | End: 2021-11-09 | Stop reason: HOSPADM

## 2021-11-08 RX ORDER — CEFAZOLIN SODIUM 2 G/100ML
2 INJECTION, SOLUTION INTRAVENOUS EVERY 8 HOURS
Status: COMPLETED | OUTPATIENT
Start: 2021-11-08 | End: 2021-11-09

## 2021-11-08 RX ORDER — MELOXICAM 7.5 MG/1
15 TABLET ORAL DAILY
Status: DISCONTINUED | OUTPATIENT
Start: 2021-11-08 | End: 2021-11-09 | Stop reason: HOSPADM

## 2021-11-08 RX ORDER — POLYETHYLENE GLYCOL 3350 17 G/17G
17 POWDER, FOR SOLUTION ORAL DAILY
Qty: 5 PACKET | Refills: 0 | Status: SHIPPED | OUTPATIENT
Start: 2021-11-09 | End: 2021-11-14

## 2021-11-08 RX ORDER — DROPERIDOL 2.5 MG/ML
0.62 INJECTION, SOLUTION INTRAMUSCULAR; INTRAVENOUS ONCE AS NEEDED
Status: DISCONTINUED | OUTPATIENT
Start: 2021-11-08 | End: 2021-11-08 | Stop reason: HOSPADM

## 2021-11-08 RX ORDER — SODIUM CHLORIDE, SODIUM LACTATE, POTASSIUM CHLORIDE, CALCIUM CHLORIDE 600; 310; 30; 20 MG/100ML; MG/100ML; MG/100ML; MG/100ML
9 INJECTION, SOLUTION INTRAVENOUS CONTINUOUS
Status: DISCONTINUED | OUTPATIENT
Start: 2021-11-08 | End: 2021-11-09 | Stop reason: HOSPADM

## 2021-11-08 RX ORDER — MAGNESIUM HYDROXIDE 1200 MG/15ML
LIQUID ORAL AS NEEDED
Status: DISCONTINUED | OUTPATIENT
Start: 2021-11-08 | End: 2021-11-08 | Stop reason: HOSPADM

## 2021-11-08 RX ORDER — PREGABALIN 75 MG/1
75 CAPSULE ORAL ONCE
Status: COMPLETED | OUTPATIENT
Start: 2021-11-08 | End: 2021-11-08

## 2021-11-08 RX ORDER — SODIUM CHLORIDE 0.9 % (FLUSH) 0.9 %
10 SYRINGE (ML) INJECTION EVERY 12 HOURS SCHEDULED
Status: DISCONTINUED | OUTPATIENT
Start: 2021-11-08 | End: 2021-11-08 | Stop reason: HOSPADM

## 2021-11-08 RX ORDER — PROPOFOL 10 MG/ML
VIAL (ML) INTRAVENOUS AS NEEDED
Status: DISCONTINUED | OUTPATIENT
Start: 2021-11-08 | End: 2021-11-08 | Stop reason: SURG

## 2021-11-08 RX ADMIN — CEFAZOLIN SODIUM 2 G: 2 INJECTION, SOLUTION INTRAVENOUS at 23:51

## 2021-11-08 RX ADMIN — CEFAZOLIN SODIUM 2 G: 2 INJECTION, SOLUTION INTRAVENOUS at 07:47

## 2021-11-08 RX ADMIN — LIDOCAINE HYDROCHLORIDE 0.5 ML: 10 INJECTION, SOLUTION EPIDURAL; INFILTRATION; INTRACAUDAL; PERINEURAL at 06:44

## 2021-11-08 RX ADMIN — PROPOFOL 50 MG: 10 INJECTION, EMULSION INTRAVENOUS at 07:51

## 2021-11-08 RX ADMIN — ROPIVACAINE HYDROCHLORIDE 10 ML/HR: 2 INJECTION, SOLUTION EPIDURAL; INFILTRATION at 09:43

## 2021-11-08 RX ADMIN — LIDOCAINE HYDROCHLORIDE 40 MG: 10 INJECTION, SOLUTION EPIDURAL; INFILTRATION; INTRACAUDAL; PERINEURAL at 07:48

## 2021-11-08 RX ADMIN — BUPIVACAINE HYDROCHLORIDE 2 ML: 5 INJECTION, SOLUTION PERINEURAL at 07:50

## 2021-11-08 RX ADMIN — OXYCODONE 5 MG: 5 TABLET ORAL at 13:43

## 2021-11-08 RX ADMIN — ACETAMINOPHEN 1000 MG: 500 TABLET, FILM COATED ORAL at 19:53

## 2021-11-08 RX ADMIN — PROPOFOL 100 MCG/KG/MIN: 10 INJECTION, EMULSION INTRAVENOUS at 07:51

## 2021-11-08 RX ADMIN — PROPOFOL 50 MG: 10 INJECTION, EMULSION INTRAVENOUS at 07:48

## 2021-11-08 RX ADMIN — FAMOTIDINE 20 MG: 20 TABLET ORAL at 06:56

## 2021-11-08 RX ADMIN — PANTOPRAZOLE SODIUM 40 MG: 40 TABLET, DELAYED RELEASE ORAL at 12:21

## 2021-11-08 RX ADMIN — CEFAZOLIN SODIUM 2 G: 2 INJECTION, SOLUTION INTRAVENOUS at 15:31

## 2021-11-08 RX ADMIN — ACETAMINOPHEN 1000 MG: 500 TABLET, FILM COATED ORAL at 13:43

## 2021-11-08 RX ADMIN — ONDANSETRON 4 MG: 2 INJECTION INTRAMUSCULAR; INTRAVENOUS at 09:10

## 2021-11-08 RX ADMIN — SODIUM CHLORIDE, POTASSIUM CHLORIDE, SODIUM LACTATE AND CALCIUM CHLORIDE 9 ML/HR: 600; 310; 30; 20 INJECTION, SOLUTION INTRAVENOUS at 06:44

## 2021-11-08 RX ADMIN — PREGABALIN 75 MG: 75 CAPSULE ORAL at 06:56

## 2021-11-08 RX ADMIN — MUPIROCIN 1 APPLICATION: 20 OINTMENT TOPICAL at 06:56

## 2021-11-08 RX ADMIN — SODIUM CHLORIDE, PRESERVATIVE FREE 3 ML: 5 INJECTION INTRAVENOUS at 12:22

## 2021-11-08 RX ADMIN — MELOXICAM 15 MG: 7.5 TABLET ORAL at 12:21

## 2021-11-08 RX ADMIN — SODIUM CHLORIDE 150 ML/HR: 9 INJECTION, SOLUTION INTRAVENOUS at 12:21

## 2021-11-08 RX ADMIN — BUPIVACAINE HYDROCHLORIDE 30 ML: 2.5 INJECTION, SOLUTION EPIDURAL; INFILTRATION; INTRACAUDAL; PERINEURAL at 09:37

## 2021-11-08 RX ADMIN — DEXAMETHASONE SODIUM PHOSPHATE 8 MG: 4 INJECTION, SOLUTION INTRA-ARTICULAR; INTRALESIONAL; INTRAMUSCULAR; INTRAVENOUS; SOFT TISSUE at 07:59

## 2021-11-08 RX ADMIN — PROPOFOL 80 MCG/KG/MIN: 10 INJECTION, EMULSION INTRAVENOUS at 08:41

## 2021-11-08 NOTE — OP NOTE
TOTAL KNEE ARTHROPLASTY  Progress Note    Stella Edmonds  11/8/2021    Pre-op Diagnosis:   Right knee osteoarthritis       Post-Op Diagnosis Codes:  Right knee osteoarthritis    Procedure/CPT® Codes:  96747    Procedure(s):  TOTAL KNEE ARTHROPLASTY RIGHT    Surgeon(s):  Turner Lira MD    Assistant: Wilfredo Radford PA-C who was required for skilled retraction and assistance with component preparation, suction and irrigation and cement debridement during component placement.  He was primary responsible for closure and application of vacuum dressing.  His assistance was required for the success of this complicated case.    Anesthesia: Spinal    Staff:   Circulator: Jana Schrader RN  Physician Assistant: Aguilar Radford PA  Scrub Person: Chris Jett  Vendor Representative: Pankaj Sanchez  Nursing Assistant: Gemini Yousif PCT         Estimated Blood Loss: minimal    Urine Voided: * No values recorded between 11/8/2021  7:44 AM and 11/8/2021  9:33 AM *    Specimens:                None          Drains: * No LDAs found *    Findings: Moderate to high-grade medial compartment cartilage excoriation.  Mild to minimal lateral and patellofemoral changes.    Complications: None        Implants: [employer] system              Femoral size 4N (PCL sparing)              Patellar button size 29              Tibia size 2 with 9 mm poly spacer      Indications:  66-year-old with endstage right knee osteoarthritic symptoms and corresponding x-rays showing near complete loss medial joint space.  Left knee has more advanced disease radiographically but symptoms are not as severe.  No sustained relief from reasonable non-operative management.  Risks benefits and indications and rationale for right total knee arthroplasty discussed at length with patient.  Patient is made aware of possible mechanical or infectious complications for TKA as well as possible perioperative medical complication. Patient signs her own  consent after all questions are answered.      Procedure:  While in the OR spinal anesthesia started with satisfactory level. Turned to the supine position and prepped and draped in standard fashion from mid-thigh to the ankle using the sliding leg ace. Tourniquet inflated at 300 mg Hg with total tourniquet time approximately 68 minutes. Standard anterior incision made medial to the patella. Medial parapatellar arthrotomy performed. Patella was everted after adequate anterolateral debridement.  Moderate to high grade medial compartment changes noted and only mild or minimal patellofemoral and lateral compartment changes.  Intramedullary guides used for femoral preparation to size 5 at the first cut but the trial was excessive and a size 4. Standard cuts made orthogonal to Emily's line.  Femoral trial with a size 4 showed full easy extension and stable deep flexion with good patellar tracking. Proximal tibia exposed with circumferential meniscectomy. ACL was removed. PCL was preserved. Proximal tibia cut made nearly perpendicular to the mechanical axis.  Tibia sized to #2 with 9 mm trial spacer showing adequate terminal extension after debriding posterior osteophytes.  Floating jig was utilized to confirm optimal placement of the final component.  The final tibial component was at the far lateral bony margin.  Patella cut with jig leaving 12 mm remnant. Patellar trial positioned and prepared accordingly. Trials at this point showed easy motion, patellar tracking and varus-valgus stability. Motion demonstrated 0/0/150 with optimal deep flexion stability with the 9 mm spacer. Trials removed and bone surfaces thoroughly irrigated. Standard cement technique used for femoral and tibial  and patellar components with excess cement removed during the curing process. Final components assembled and reduced with stability and range of motion and patellar tracking similar to the trials.  Posterior capsule injected with  local anesthetic.  Wound was thoroughly irrigated and closed in layers without a drain. Standard Brooke dressing applied. Final counts were correct. Patient stable to recovery having tolerated procedure well throughout.          Turner Lira MD   Date: 11/8/2021  Time: 09:48 EST

## 2021-11-08 NOTE — SIGNIFICANT NOTE
Transport notified RN that patient passed out in chair when he was picking her up for discharge. Upon arrival to room, rapid response called. VS obtained. New IV obtained. MD notified.

## 2021-11-08 NOTE — ANESTHESIA PROCEDURE NOTES
Spinal Block      Patient reassessed immediately prior to procedure    Patient location during procedure: OR  Indication:at surgeon's request  Performed By  CRNA: Martine Ledesma CRNA  Preanesthetic Checklist  Completed: patient identified, IV checked, site marked, risks and benefits discussed, surgical consent, monitors and equipment checked, pre-op evaluation and timeout performed  Spinal Block Prep:  Patient Position:sitting  Sterile Tech:cap, gloves, sterile barriers and mask  Prep:Chloraprep  Patient Monitoring:continuous pulse oximetry  Spinal Block Procedure  Approach:midline  Guidance:landmark technique and palpation technique  Location:L4-L5  Needle Type:Quincke  Needle Gauge:22 G  Placement of Spinal needle event:cerebrospinal fluid aspirated  Paresthesia: no  Fluid Appearance:clear  Medications: bupivacaine (MARCAINE) 0.5 % injection, 2 mL  Med Administered at 11/8/2021 7:50 AM   Post Assessment  Patient Tolerance:patient tolerated the procedure well with no apparent complications  Complications no  Additional Notes  Procedure:  Pt assisted to sitting position, with legs in position of comfort over side of bed.  Pt. instructed in optimal spine presentation, the spine was prepped/ Draped and the skin at insertion site was anesthetized with 1% Lidocaine 2 ml.  The spinal needle was then advanced until CSF flow was obtained and LA was injected:

## 2021-11-08 NOTE — ANESTHESIA PROCEDURE NOTES
Right Adductor Canal Catheter      Patient reassessed immediately prior to procedure    Patient location during procedure: post-op  Reason for block: at surgeon's request and post-op pain management  Performed by  CRNA: Caitlyn Mcghee CRNA  Assisted by: Lilly Swanson RN  Preanesthetic Checklist  Completed: patient identified, IV checked, site marked, risks and benefits discussed, surgical consent, monitors and equipment checked, pre-op evaluation and timeout performed  Prep:  Pt Position: supine  Sterile barriers:cap, gloves, mask and sterile barriers  Prep: ChloraPrep  Patient monitoring: blood pressure monitoring, continuous pulse oximetry and EKG  Procedure  Performed under: spinal  Guidance:ultrasound guided  Images:still images obtained, printed/placed on chart    Laterality:right  Block Type:adductor canal block  Injection Technique:catheter  Needle Type:Tuohy and echogenic  Needle Gauge:18 G  Resistance on Injection: none  Catheter Size:20 G (20g)  Cath Depth at skin: 10 cm    Medications Used: bupivacaine PF (MARCAINE) 0.25 % injection, 30 mL  Med administered at 11/8/2021 9:37 AM      Post Assessment  Injection Assessment: negative aspiration for heme, incremental injection and no paresthesia on injection  Patient Tolerance:comfortable throughout block  Complications:no  Additional Notes  Procedure:             The pt was placed in the Supine position.  The Insertion site was  prepped and Draped in sterile fashion.  The pt was anesthetized with  IV Sedation( see meds).  Skin and cutaneous tissue was infiltrated and anesthetized with 1% Lidocaine 3 mls via a 25g needle.  A BBraun 4 inch 18g echogenic needle was then  inserted approximately midline, mid-thigh and advanced In-plane with Ultrasound guidance.  Normal Saline PSF was utilized for hydrodissection of tissue.  The Vastus medialis and Sartorius muscle where visualized and the needle tip was placed in the adductor canal,  lateral to the femoral  artery.  LA injection spread was visualized, injection was incremental 1-5ml, injection pressure was normal or little, no intraneural injection, no vascular injection.  LA dose was injected thru the needle(see dose above).  A BBraun 20g wire stylet catheter was placed via the needle with ultrasound visualization and confirmation with NS fluid bolus. The catheter insertion site was sealed with exofin tissue adhesive. The labeled catheter was then coiled and secured to skin with benzoin,  steristrips and CHG transparent dressing.  Appropriate labels were applied.  Thank you.

## 2021-11-08 NOTE — H&P
Patient Name: Stella Edmonds  MRN: [id number]  : 1955  DOS: 2021    Attending: Turner Lira MD    Primary Care Provider: Ethan Blanco MD      Chief complaint: Right knee Pain.    Subjective   Patient is a pleasant 66 y.o. female presented for scheduled surgery by Dr. Lira.    Per his note (66-year-old with endstage right knee osteoarthritic symptoms and corresponding x-rays showing near complete loss medial joint space.  Left knee has more advanced disease radiographically but symptoms are not as severe.  No sustained relief from reasonable non-operative management.  Risks benefits and indications and rationale for right total knee arthroplasty discussed at length with patient.  Patient is made aware of possible mechanical or infectious complications for TKA as well as possible perioperative medical complication. Patient signs her own consent after all questions are answered.).    Patient underwent right total knee arthroplasty under spinal anesthesia, tolerated surgery well.  Adductor canal nerve block catheter was placed by acute pain service.    She was admitted for the management, when seen in her room afterwards she is doing very well.  Her pain control is adequate.  She has ambulated with physical therapy.  Tolerated p.o. diet.    She has no history of DVT or PE.    Patient and  are very motivated for home discharge later today.      Allergies   Allergen Reactions   • Orange Fruit [Citrus] Hives and Nausea And Vomiting     vomiting   • Other Other (See Comments)     History of possible seizures, patient felt related to orange dye.   Most pain meds lead to GI upset/vomiting.  Orange Dye   • Codeine      n/v   • Lortab [Hydrocodone-Acetaminophen]      n/v   • Morphine And Related      Passed out   • Sulfa Antibiotics Unknown - Low Severity     Patient states allergy to sulfa       Meds:  Medications Prior to Admission   Medication Sig Dispense Refill Last Dose   • esomeprazole  (nexIUM) 40 MG capsule Take 1 capsule by mouth Every Morning Before Breakfast. 90 capsule 3 2021 at 0700   • fluconazole (DIFLUCAN) 150 MG tablet Take 150 mg by mouth 1 (One) Time.   Past Month at Unknown time   • levothyroxine (SYNTHROID, LEVOTHROID) 75 MCG tablet Take 75 mcg by mouth daily.   2021 at 0400   • losartan-hydrochlorothiazide (HYZAAR) 50-12.5 MG per tablet Take 1 tablet by mouth Daily.   2021 at 0700   • meloxicam (MOBIC) 15 MG tablet Take 15 mg by mouth Daily.   Past Month at Unknown time   • propranolol (INDERAL) 20 MG tablet Take 20 mg by mouth Daily. Pt states doctor decreased from BID to once daily.   2021 at 0400   • rosuvastatin (CRESTOR) 5 MG tablet Take 5 mg by mouth. TWICE A WEEK ON Monday AND Tuesday.   2021 at Unknown time   • VITAMIN D, ERGOCALCIFEROL, PO Take 50,000 mcg by mouth 1 (One) Time Per Week.  at Unknown time       Past Medical History:   Diagnosis Date   • Abnormal EKG     History of abnormal EKG: Myocardial perfusion study, 2009:  Reduced exercise tolerance, no evidence of inducible ischemia, normal LV systolic function.    • Abnormal leg movement     Probable restless leg syndrome.   • Asthma    • AVM (arteriovenous malformation)     Frontal AV malformation with removal by Dr. Sarmiento, .    • Back pain    • Esophageal dysmotility    • Fatigue      She reports that she is feeling less fatigued off her diuretic. Lightheadedness and dizziness; improved.   • Gastritis    • GERD (gastroesophageal reflux disease)    • Globus sensation    •  1 para 1    • Hiatal hernia    • History of hepatitis A     Age 9   • Hot flashes    • Hyperlipidemia    • Hypertension    • Hypothyroidism    • Lower extremity edema     Mild.She did state that she does have some increase in her lower extremity edema, and Dr. Willett discussed with her that she may take hydrochlorothiazide 12.5 or half a tablet on an as needed basis.   •  "Migraines     Migraines, treated with propranolol.   • Obesity (BMI 30.0-34.9)     BMI 33.5   • PONV (postoperative nausea and vomiting)    • Reflux esophagitis    • Schatzki's ring    • Seizure (HCC)     last seizure - 2 years ago   • UTI (urinary tract infection)     History of UTIs   • Varicose vein     Varicosities   • Wears eyeglasses      Past Surgical History:   Procedure Laterality Date   • BREAST BIOPSY Right 07/10/2017    stereo bx   • BREAST BIOPSY Left 02/02/2018    stereo bx   • BREAST BIOPSY Right 02/2019    stereo   • COLONOSCOPY      8/2017   • ESOPHAGEAL DILATATION     • HYSTERECTOMY      total 1997   • LUMBAR DISCECTOMY Left 10/4/2017    Procedure: LUMBAR DISCECTOMY MICRO ENDOSCOPIC L5-S1 RIGHT;  Surgeon: Pancho Au MD;  Location: [facility];  Service:    • OOPHORECTOMY  1997   • OTHER SURGICAL HISTORY  08/07/2015    Prior right frontal craniotomy for Spetzler-Chucky grade I arteriovenous malformation.-DR. AU/DR. BUNDY   • OTHER SURGICAL HISTORY  2015    Frontal AV malformation with removal, Dr. Au, 2015.   • RENAL ARTERY STENT      pt denies   • UPPER GASTROINTESTINAL ENDOSCOPY  11/02/2005     Family History   Problem Relation Age of Onset   • Breast cancer Mother 34   • Arthritis Mother    • Cervical cancer Other    • Ovarian cancer Paternal Grandmother 82   • Arthritis Father      Social History     Tobacco Use   • Smoking status: Never Smoker   • Smokeless tobacco: Never Used   Substance Use Topics   • Alcohol use: No   • Drug use: No   .    Review of Systems  Pertinent items are noted in HPI, all other systems reviewed and negative    Vital Signs  /91 (BP Location: Left arm, Patient Position: Lying)   Pulse 64   Temp 98.7 °F (37.1 °C) (Temporal)   Resp 18   Ht 157.5 cm (62\")   Wt 83 kg (183 lb)   SpO2 98%   BMI 33.47 kg/m²     Physical Exam:    General Appearance:    Alert, cooperative, in no acute distress   Head:    Normocephalic, without obvious " abnormality, atraumatic   Eyes:            Lids and lashes normal, conjunctivae and sclerae normal, no   icterus, no pallor, corneas clear    Ears:    Ears appear intact with no abnormalities noted   Throat:   No oral lesions, no thrush, oral mucosa moist   Neck:   No adenopathy, supple, trachea midline, no thyromegaly         Lungs:     Clear to auscultation,respirations regular, even and                   unlabored    Heart:    Regular rhythm and normal rate, normal S1 and S2, no       murmur, no gallop   Abdomen:     Normal bowel sounds, no masses, no organomegaly, soft        non-tender, non-distended, no guarding, no rebound                 tenderness   Genitalia:    Deferred   Extremities:  Right LE, CDI dressing on knee/cheryl dressing system, PNB cath present.    Pulses:   Pulses palpable and equal bilaterally   Skin:   No bleeding, bruising or rash   Neurologic:   Cranial nerves 2 - 12 grossly intact, intact flexion dorsiflexion bilateral feet      I reviewed the patient's new clinical results.             Invalid input(s): NEUTOPHILPCT,  EOSPCT  Results from last 7 days   Lab Units 11/08/21  0655   POTASSIUM mmol/L 3.9     Lab Results   Component Value Date    HGBA1C 4.90 10/29/2021     Results for JAVIER ECHAVARRIA (MRN [id number]) as of 11/8/2021 16:01   Ref. Range 10/29/2021 09:11   WBC Latest Ref Range: 3.40 - 10.80 10*3/mm3 7.66   RBC Latest Ref Range: 3.77 - 5.28 10*6/mm3 4.20   Hemoglobin Latest Ref Range: 12.0 - 15.9 g/dL 13.2   Hematocrit Latest Ref Range: 34.0 - 46.6 % 40.1   RDW Latest Ref Range: 12.3 - 15.4 % 13.0   MCV Latest Ref Range: 79.0 - 97.0 fL 95.5   MCH Latest Ref Range: 26.6 - 33.0 pg 31.4   MCHC Latest Ref Range: 31.5 - 35.7 g/dL 32.9   MPV Latest Ref Range: 6.0 - 12.0 fL 10.8   Platelets Latest Ref Range: 140 - 450 10*3/mm3 274   RDW-SD Latest Ref Range: 37.0 - 54.0 fl 46.1     Results for JAVIER ECHAVARRIA (MRN [id number]) as of 11/8/2021 16:01   Ref. Range 10/29/2021 09:11    Glucose Latest Ref Range: 65 - 99 mg/dL 94   Sodium Latest Ref Range: 136 - 145 mmol/L 139   Potassium Latest Ref Range: 3.5 - 5.2 mmol/L 4.0   CO2 Latest Ref Range: 22.0 - 29.0 mmol/L 27.0   Chloride Latest Ref Range: 98 - 107 mmol/L 101   Anion Gap Latest Ref Range: 5.0 - 15.0 mmol/L 11.0   Creatinine Latest Ref Range: 0.57 - 1.00 mg/dL 0.71   BUN Latest Ref Range: 8 - 23 mg/dL 18   BUN/Creatinine Ratio Latest Ref Range: 7.0 - 25.0  25.4 (H)   Calcium Latest Ref Range: 8.6 - 10.5 mg/dL 9.5   eGFR Non African Am Latest Ref Range: >60 mL/min/1.73 83   Alkaline Phosphatase Latest Ref Range: 39 - 117 U/L 90   Total Protein Latest Ref Range: 6.0 - 8.5 g/dL 6.5   ALT (SGPT) Latest Ref Range: 1 - 33 U/L 11   AST (SGOT) Latest Ref Range: 1 - 32 U/L 14   Total Bilirubin Latest Ref Range: 0.0 - 1.2 mg/dL 0.5   Albumin Latest Ref Range: 3.50 - 5.20 g/dL 4.20   Globulin Latest Units: gm/dL 2.3       Assessment and Plan:       S/P total knee arthroplasty, right    Hyperlipidemia    Hypertension    GERD (gastroesophageal reflux disease)    Arthritis of right knee      Plan  1. PT/OT,  Weight bearing as tolerated right LE  2. Pain control-prns, ACB cath with ropivacaine infusion.  3. IS-encourage  4. DVT proph- Mechanicals and daily aspirin for 1 month  5. Bowel regimen  6. Resume home medications as appropriate  7. Monitor post-op labs  8. DC planning for home    Reviewed with patient and her  goals to achieve prior to home discharge.  Patient has progressed well through the afternoon, will be receiving IV antibiotics second dose of prophylaxis shortly.  She is watching the video for instruction on peripheral nerve block catheter and pump.    I reviewed with patient medications following discharge, pain control, bowel regimen, DVT prophylaxis.  Answered all questions.    Dragon disclaimer:  Part of this encounter note is an electronic transcription/translation of spoken language to printed text. The electronic  translation of spoken language may permit erroneous, or at times, nonsensical words or phrases to be inadvertently transcribed; Although I have reviewed the note for such errors, some may still exist.    Mike Christensen MD  11/08/21  12:11 EST    Rapid response was called as patient had a syncopal episode upon trying to get to chair with transport for discharge.  Upon laying patient supine she regained consciousness and answers questions appropriately, followed commands.  No chest pain, no shortness of breath.  Apparently increased pain in her surgical knee as well as nausea.  Vital signs obtained were noted.  No bradycardia or hypotension at this point.  Lungs are clear, heart is regular, no focal deficit take into account limitation examining right leg.  Explained to patient that she will be observed overnight, she will receive a bolus of IV fluids.  Etiology of her syncope likely orthostatic hypotension versus vagal response.  I discussed with patient's  as well.

## 2021-11-08 NOTE — H&P
Pre-Op H&P  Stella Edmonds  [id number]  1955    Chief complaint: Right knee pain    HPI:    Patient is a 66 y.o.female who presents with a one year history of right knee pain. Pt is a former  and reports that years of work has led to her knee hurting. Pt reports that she has failed to receive relief using conservative therapy and is now scheduled to undergo a right total knee arthroplasty with Dr. Lira.       Review of Systems:  General ROS: negative for chills, fever or skin lesions;  No changes since last office visit.  Neg for recent sick exposure  Cardiovascular ROS: no chest pain or dyspnea on exertion  Respiratory ROS: no cough, shortness of breath, or wheezing    Allergies:   Allergies   Allergen Reactions    Orange Fruit [Citrus] Hives and Nausea And Vomiting     vomiting    Other Other (See Comments)     History of possible seizures, patient felt related to orange dye.   Most pain meds lead to GI upset/vomiting.  Orange Dye    Codeine      n/v    Lortab [Hydrocodone-Acetaminophen]      n/v    Morphine And Related      Passed out       Home Meds:    No current facility-administered medications on file prior to encounter.     Current Outpatient Medications on File Prior to Encounter   Medication Sig Dispense Refill    esomeprazole (nexIUM) 40 MG capsule Take 1 capsule by mouth Every Morning Before Breakfast. 90 capsule 3    levothyroxine (SYNTHROID, LEVOTHROID) 75 MCG tablet Take 75 mcg by mouth daily.      losartan-hydrochlorothiazide (HYZAAR) 50-12.5 MG per tablet Take 1 tablet by mouth Daily.      propranolol (INDERAL) 20 MG tablet Take 20 mg by mouth Daily. Pt states doctor decreased from BID to once daily.      rosuvastatin (CRESTOR) 5 MG tablet Take 5 mg by mouth. TWICE A WEEK ON Monday AND Tuesday.      VITAMIN D, ERGOCALCIFEROL, PO Take 50,000 mcg by mouth 1 (One) Time Per Week. THURSDAYS      [DISCONTINUED] pantoprazole (PROTONIX) 40 MG EC tablet Take 40 mg by mouth Daily.          PMH:   Past Medical History:   Diagnosis Date    Abnormal EKG     History of abnormal EKG: Myocardial perfusion study, 2009:  Reduced exercise tolerance, no evidence of inducible ischemia, normal LV systolic function.     Abnormal leg movement     Probable restless leg syndrome.    Asthma     AVM (arteriovenous malformation)     Frontal AV malformation with removal by Dr. Sarmiento, .     Back pain     Esophageal dysmotility     Fatigue      She reports that she is feeling less fatigued off her diuretic. Lightheadedness and dizziness; improved.    Gastritis     GERD (gastroesophageal reflux disease)     Globus sensation      1 para 1     Hiatal hernia     History of hepatitis A     Age 9    Hot flashes     Hyperlipidemia     Hypertension     Hypothyroidism     Lower extremity edema     Mild.She did state that she does have some increase in her lower extremity edema, and Dr. Willett discussed with her that she may take hydrochlorothiazide 12.5 or half a tablet on an as needed basis.    Migraines     Migraines, treated with propranolol.    Obesity (BMI 30.0-34.9)     BMI 33.5    PONV (postoperative nausea and vomiting)     Reflux esophagitis     Schatzki's ring     Seizure (HCC)     last seizure - 2 years ago    UTI (urinary tract infection)     History of UTIs    Varicose vein     Varicosities    Wears eyeglasses      PSH:    Past Surgical History:   Procedure Laterality Date    BREAST BIOPSY Right 07/10/2017    stereo bx    BREAST BIOPSY Left 2018    stereo bx    BREAST BIOPSY Right 2019    stereo    COLONOSCOPY      2017    ESOPHAGEAL DILATATION      HYSTERECTOMY      total     LUMBAR DISCECTOMY Left 10/4/2017    Procedure: LUMBAR DISCECTOMY MICRO ENDOSCOPIC L5-S1 RIGHT;  Surgeon: Pancho Sarmiento MD;  [facility]: [facility];  Service:     OOPHORECTOMY      OTHER SURGICAL HISTORY  2015    Prior right frontal craniotomy for Spetzler-Chucky grade I arteriovenous  "malformation.-DR. AU/DR. BUNDY    OTHER SURGICAL HISTORY  2015    Frontal AV malformation with removal, Dr. Au, 2015.    RENAL ARTERY STENT      pt denies    UPPER GASTROINTESTINAL ENDOSCOPY  11/02/2005       Immunization History:  Influenza: NO  Pneumococcal: NO  Tetanus: NO  COVID: YES    Social History:   Tobacco:   Social History     Tobacco Use   Smoking Status Never Smoker   Smokeless Tobacco Never Used      Alcohol:     Social History     Substance and Sexual Activity   Alcohol Use No       Vitals:           /93 (BP Location: Right arm, Patient Position: Lying)   Pulse 71   Temp 97.5 °F (36.4 °C) (Temporal)   Resp 18   Ht 157.5 cm (62\")   Wt 83 kg (183 lb)   SpO2 99%   BMI 33.47 kg/m²     Physical Exam:  General Appearance:    Alert, cooperative, no distress, appears stated age   Head:    Normocephalic, without obvious abnormality, atraumatic   Lungs:     Clear to auscultation bilaterally, respirations unlabored    Heart:   Regular rate and rhythm, S1 and S2 normal, no murmur, rub    or gallop    Abdomen:    Soft, nontender.  +bowel sounds   Breast Exam:    deferred   Genitalia:    deferred   Extremities:   Extremities normal, atraumatic, no cyanosis or edema   Skin:   Skin color, texture, turgor normal, no rashes or lesions   Neurologic:   Grossly intact   Results Review  LABS:  Lab Results   Component Value Date    WBC 7.99 10/29/2021    WBC 7.66 10/29/2021    HGB 13.3 10/29/2021    HGB 13.2 10/29/2021    HCT 40.3 10/29/2021    HCT 40.1 10/29/2021    MCV 95.0 10/29/2021    MCV 95.5 10/29/2021     10/29/2021     10/29/2021    NEUTROABS 5.62 10/29/2021    GLUCOSE 94 10/29/2021    BUN 18 10/29/2021    CREATININE 0.71 10/29/2021    EGFRIFNONA 83 10/29/2021     10/29/2021    K 4.0 10/29/2021     10/29/2021    CO2 27.0 10/29/2021    CALCIUM 9.5 10/29/2021    ALBUMIN 4.20 10/29/2021    AST 14 10/29/2021    ALT 11 10/29/2021    BILITOT 0.5 10/29/2021    PTT 28 " 06/25/2015    INR 0.90 08/04/2015       RADIOLOGY:  No radiology results for the last 3 days     I reviewed the patient's new clinical results.    Cancer Staging (if applicable)  Cancer Patient: __ yes __no _X_unknown; If yes, clinical stage T:__ N:__M:__, stage group or __N/A    Impression: Primary right knee osteoarthritis     Plan: Right total knee arthroplasty      EDUARDO Pitt   11/08/21   7:05 AM EST

## 2021-11-08 NOTE — PLAN OF CARE
Goal Outcome Evaluation:  Plan of Care Reviewed With: patient, spouse        Progress: improving  Outcome Summary: DC'd home

## 2021-11-08 NOTE — ANESTHESIA PREPROCEDURE EVALUATION
Anesthesia Evaluation     Patient summary reviewed and Nursing notes reviewed   history of anesthetic complications:  NPO Solid Status: > 8 hours  NPO Liquid Status: > 2 hours           Airway   Mallampati: II  TM distance: >3 FB  Neck ROM: limited  No difficulty expected  Dental    (+) partials        Pulmonary    (+) asthma (No MDI),  (-) shortness of breath, recent URI, sleep apnea, not a smoker  Cardiovascular     ECG reviewed  Patient on routine beta blocker    (+) hypertension, hyperlipidemia,   (-) past MI, dysrhythmias, angina    ROS comment: Normal sinus rhythm  PRWP LEADS V1 - V4    Neuro/Psych  (+) seizures (x1 2 years ago -data def - no meds ),     (-) TIA, CVA    ROS Comment: AV Malformation frontal lobe  GI/Hepatic/Renal/Endo    (+)  hiatal hernia, GERD,  thyroid problem hypothyroidism  (-) no renal disease, diabetes    Musculoskeletal     (+) back pain,   Abdominal    Substance History      OB/GYN    (-)  Pregnant        Other            Phys Exam Other: Lower partials                Anesthesia Plan    ASA 2     spinal   (ACB Cath post op )  intravenous induction     Anesthetic plan, all risks, benefits, and alternatives have been provided, discussed and informed consent has been obtained with: patient.    Plan discussed with CRNA.

## 2021-11-08 NOTE — CASE MANAGEMENT/SOCIAL WORK
Continued Stay Note  [facility]     Patient Name: Stella Edmonds  MRN: [id number]  Today's Date: 11/8/2021    Admit Date: 11/8/2021     Discharge Plan     Row Name 11/08/21 1530       Plan    Plan Home    Patient/Family in Agreement with Plan yes    Plan Comments chart reviewed.  I met with Mrs Edmonds and  at bedside to discuss d/c plan. She plans to return home this pm. Prior to admit she was independent with all ADLs, driving,etc. PT ruslan noted,ambulated 350ft. she has a rolling walker at home and elevated toilets. We discussed options for PT. She has a pre admission referral to [facility]. She does not want Home Health servicess, she prefers outpt PT. She has requested a referral to [facility]. I spoke with Ivette at [phone], who confirmed appt for tomorrow 11/9 at 1420. She is in agreement with plan. No other d/c needs identified    Final Discharge Disposition Code 01 - home or self-care               Discharge Codes    No documentation.                     Sonja C Kellerman, RN

## 2021-11-08 NOTE — ANESTHESIA POSTPROCEDURE EVALUATION
Patient: Stella Edmonds    Procedure Summary     Date: 11/08/21 [facility]    Anesthesia Start: 0744 Anesthesia Stop:     Procedure: TOTAL KNEE ARTHROPLASTY RIGHT (Right Knee) Diagnosis:     Surgeons: Turner Lira MD Provider: Humza Glass MD    Anesthesia Type: spinal ASA Status: 2          Anesthesia Type: spinal    Vitals  Vitals Value Taken Time   /73 11/08/21 0939   Temp 98.7 °F (37.1 °C) 11/08/21 0939   Pulse 66 11/08/21 0939   Resp 20 11/08/21 0939   SpO2 100 % 11/08/21 0939           Post Anesthesia Care and Evaluation    Patient location during evaluation: PACU  Patient participation: complete - patient participated  Level of consciousness: awake and alert  Pain score: 0  Pain management: adequate  Airway patency: patent  Anesthetic complications: No anesthetic complications  PONV Status: none  Cardiovascular status: hemodynamically stable and acceptable  Respiratory status: nonlabored ventilation, acceptable and nasal cannula  Hydration status: acceptable    Comments: Pt transferred to PACU with O2. Vital signs stable. Report to PACU RN and care accepted.

## 2021-11-08 NOTE — THERAPY EVALUATION
Patient Name: Stella Edmonds  : 1955    MRN: [id number]                              Today's Date: 2021       Admit Date: 2021    Visit Dx: No diagnosis found.  Patient Active Problem List   Diagnosis   • Well female exam with routine gynecological exam   • Menopausal state   • Dyspareunia   • Submucous leiomyoma of uterus   • Spinal stenosis in cervical region   • Fatigue   • Abnormal EKG   • Hyperlipidemia   • Hypertension   • AVM (arteriovenous malformation)   • Migraines   • Varicose vein   • Hypothyroidism   • GERD (gastroesophageal reflux disease)   • Obesity (BMI 30.0-34.9)   • Cervical stenosis (uterine cervix)   • Abnormal uterine bleeding (AUB)   • Herniated lumbar disc without myelopathy   • Lumbar radiculopathy   • Lumbar radiculopathy, acute   • Arthritis of right knee   • S/P total knee arthroplasty, right     Past Medical History:   Diagnosis Date   • Abnormal EKG     History of abnormal EKG: Myocardial perfusion study, 2009:  Reduced exercise tolerance, no evidence of inducible ischemia, normal LV systolic function.    • Abnormal leg movement     Probable restless leg syndrome.   • Asthma    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    • Back pain    • Esophageal dysmotility    • Fatigue      She reports that she is feeling less fatigued off her diuretic. Lightheadedness and dizziness; improved.   • Gastritis    • GERD (gastroesophageal reflux disease)    • Globus sensation    •  1 para 1    • Hiatal hernia    • History of hepatitis A     Age 9   • Hot flashes    • Hyperlipidemia    • Hypertension    • Hypothyroidism    • Lower extremity edema     Mild.She did state that she does have some increase in her lower extremity edema, and Dr. Willett discussed with her that she may take hydrochlorothiazide 12.5 or half a tablet on an as needed basis.   • Migraines     Migraines, treated with propranolol.   •  Obesity (BMI 30.0-34.9)     BMI 33.5   • PONV (postoperative nausea and vomiting)    • Reflux esophagitis    • Schatzki's ring    • Seizure (HCC)     last seizure - 2 years ago   • UTI (urinary tract infection)     History of UTIs   • Varicose vein     Varicosities   • Wears eyeglasses      Past Surgical History:   Procedure Laterality Date   • BREAST BIOPSY Right 07/10/2017    stereo bx   • BREAST BIOPSY Left 02/02/2018    stereo bx   • BREAST BIOPSY Right 02/2019    stereo   • COLONOSCOPY      8/2017   • ESOPHAGEAL DILATATION     • HYSTERECTOMY      total 1997   • LUMBAR DISCECTOMY Left 10/4/2017    Procedure: LUMBAR DISCECTOMY MICRO ENDOSCOPIC L5-S1 RIGHT;  Surgeon: Pancho Au MD;  Location: [facility];  Service:    • OOPHORECTOMY  1997   • OTHER SURGICAL HISTORY  08/07/2015    Prior right frontal craniotomy for Spetzler-Chucky grade I arteriovenous malformation.-DR. AU/DR. BUNDY   • OTHER SURGICAL HISTORY  2015    Frontal AV malformation with removal, Dr. Au, 2015.   • RENAL ARTERY STENT      pt denies   • UPPER GASTROINTESTINAL ENDOSCOPY  11/02/2005      General Information     Row Name 11/08/21 1305          Physical Therapy Time and Intention    Document Type evaluation  -TAYLOR     Mode of Treatment physical therapy; individual therapy  -TAYLOR     Row Name 11/08/21 1305          General Information    Patient Profile Reviewed yes  -TAYLOR     Prior Level of Function min assist:; all household mobility; transfer; bed mobility; ADL's  -TAYLOR     Existing Precautions/Restrictions fall; other (see comments)  R adductor nerve cath  -TAYLOR     Barriers to Rehab none identified  -TAYLOR     Row Name 11/08/21 1305          Living Environment    Lives With spouse  -TAYLOR     Row Name 11/08/21 1305          Home Main Entrance    Number of Stairs, Main Entrance one  -TAYLOR     Stair Railings, Main Entrance none  -TAYLOR     Row Name 11/08/21 1305          Stairs Within Home, Primary    Stairs, Within Home, Primary 0  -TAYLOR     Number  of Stairs, Within Home, Primary none  -TAYLOR     Row Name 11/08/21 1305          Cognition    Orientation Status (Cognition) oriented x 4  -TAYLOR     Row Name 11/08/21 1305          Safety Issues, Functional Mobility    Safety Issues Affecting Function (Mobility) safety precaution awareness; safety precautions follow-through/compliance; impulsivity  -TAYLOR     Impairments Affecting Function (Mobility) endurance/activity tolerance; strength; pain; range of motion (ROM)  -TAYLOR           User Key  (r) = Recorded By, (t) = Taken By, (c) = Cosigned By    Initials Name Provider Type    Shaka Arnold, PT Physical Therapist               Mobility     Row Name 11/08/21 1305          Bed Mobility    Comment (Bed Mobility) Received sitting UIC  -TAYLOR     Row Name 11/08/21 1305          Transfers    Comment (Transfers) Verbal cues for safe hand placement during standing/sitting and moving R LE out for comfort prior to sitting; CGA for toilet transfer  -TAYLOR     Row Name 11/08/21 1305          Sit-Stand Transfer    Sit-Stand [place] (Transfers) verbal cues; contact guard  -TAYLOR     Assistive Device (Sit-Stand Transfers) walker, front-wheeled  -TAYLOR     Row Name 11/08/21 1305          Gait/Stairs (Locomotion)    [place] Level (Gait) verbal cues; contact guard  -TAYLOR     Assistive Device (Gait) walker, front-wheeled  -TAYLOR     Distance in Feet (Gait) 360  -TAYLOR     Deviations/Abnormal Patterns (Gait) bilateral deviations; monique decreased; gait speed decreased; stride length decreased  -TAYLOR     Bilateral Gait Deviations forward flexed posture  -TAYLOR     Right Sided Gait Deviations weight shift ability decreased; heel strike decreased  -TAYLOR     [place] Level (Stairs) verbal cues; contact guard  -TAYLOR     Assistive Device (Stairs) walker, front-wheeled  -TAYLOR     Handrail Location (Stairs) none  -TAYLOR     Number of Steps (Stairs) 1  backwards technique  -TAYLOR     Ascending Technique (Stairs) step-to-step  -TAYLOR     Descending Technique (Stairs)  step-to-step  -     Comment (Gait/Stairs) Pt ambulated with step through pattern and decreased speed. Verbal cues for maintaining upright posture, body within walker, increase step length, WB through LEs. Pt ascended/descended 1 step using RW, CGA, and backwards technique. Gait/stair training limited by nausea. No knee buckling noted.  -[facility] Name 11/08/21 1305          Mobility    Extremity Weight-bearing Status right lower extremity  -     Right Lower Extremity (Weight-bearing Status) weight-bearing as tolerated (WBAT)  -           User Key  (r) = Recorded By, (t) = Taken By, (c) = Cosigned By    Initials Name Provider Type     Shaka Michael, PT Physical Therapist               Obj/Interventions     [facility] Name 11/08/21 1305          Range of Motion Comprehensive    General Range of Motion lower extremity range of motion deficits identified  -     Comment, General Range of Motion R LE AROM impaired 25%; L LE AROM WFL; able to actively DF/PF  -[facility] Name 11/08/21 1305          Strength Comprehensive (MMT)    General Manual Muscle Testing (MMT) Assessment lower extremity strength deficits identified  -     Comment, General Manual Muscle Testing (MMT) Assessment R LE functionally 4-/5; L LE functionally 4+/5; IND with SLR  -[facility] Name 11/08/21 1305          Motor Skills    Therapeutic Exercise hip; knee; ankle  -[facility] Name 11/08/21 1305          Hip (Therapeutic Exercise)    Hip (Therapeutic Exercise) isometric exercises  -     Hip Isometrics (Therapeutic Exercise) gluteal sets; 10 repetitions  -[facility] Name 11/08/21 1305          Knee (Therapeutic Exercise)    Knee (Therapeutic Exercise) isometric exercises  -     Knee Isometrics (Therapeutic Exercise) quad sets; 10 repetitions  -[facility] Name 11/08/21 1305          Ankle (Therapeutic Exercise)    Ankle (Therapeutic Exercise) AROM (active range of motion)  -     Ankle AROM (Therapeutic Exercise) bilateral; dorsiflexion;  plantarflexion; 10 repetitions  -TAYLOR     Row Name 11/08/21 1305          Sensory Assessment (Somatosensory)    Sensory Assessment (Somatosensory) LE sensation intact  -TAYLOR           User Key  (r) = Recorded By, (t) = Taken By, (c) = Cosigned By    Initials Name Provider Type    Shaka Arnold, PT Physical Therapist               Goals/Plan     Row Name 11/08/21 1305          Bed Mobility Goal 1 (PT)    Activity/Assistive Device (Bed Mobility Goal 1, PT) sit to supine/supine to sit  -TAYLOR     [place] Level/Cues Needed (Bed Mobility Goal 1, PT) modified independence  -TAYLOR     Time Frame (Bed Mobility Goal 1, PT) long term goal (LTG); 3 days  -TAYLOR     Row Name 11/08/21 1305          Transfer Goal 1 (PT)    Activity/Assistive Device (Transfer Goal 1, PT) sit-to-stand/stand-to-sit; walker, rolling  -TAYLOR     [place] Level/Cues Needed (Transfer Goal 1, PT) modified independence  -TAYLOR     Time Frame (Transfer Goal 1, PT) long term goal (LTG); 3 days  -     Row Name 11/08/21 1305          Gait Training Goal 1 (PT)    Activity/Assistive Device (Gait Training Goal 1, PT) gait (walking locomotion); walker, rolling  -TAYLOR     [place] Level (Gait Training Goal 1, PT) modified independence  -TAYLOR     Distance (Gait Training Goal 1, PT) 500 feet  -TAYLOR     Time Frame (Gait Training Goal 1, PT) long term goal (LTG); 3 days  -     Row Name 11/08/21 1305          ROM Goal 1 (PT)    ROM Goal 1 (PT) R knee AROM 0-90 degrees  -TAYLOR     Time Frame (ROM Goal 1, PT) long-term goal (LTG); 3 days  -     Row Name 11/08/21 130          Stairs Goal 1 (PT)    Activity/Assistive Device (Stairs Goal 1, PT) stairs, all skills; walker, rolling  -TAYLOR     [place] Level/Cues Needed (Stairs Goal 1, PT) modified independence  -TAYLOR     Number of Stairs (Stairs Goal 1, PT) 1  backwards technique  -TAYLOR     Time Frame (Stairs Goal 1, PT) long term goal (LTG); 3 days  -TAYLOR           User Key  (r) = Recorded By, (t) = Taken By, (c) = Cosigned By     Initials Name Provider Type    Shaka Arnold, PT Physical Therapist               Clinical Impression     Row Name 11/08/21 1305          Pain    Additional Documentation Pain Scale: Numbers Pre/Post-Treatment (Group)  -TAYLOR     Row Name 11/08/21 1305          Pain Scale: Numbers Pre/Post-Treatment    Pretreatment Pain Rating 7/10  -TAYLOR     Posttreatment Pain Rating 8/10  -TAYLOR     Pain Location - Side Right  -TAYLOR     Pain Location - Orientation anterior  -TAYLOR     Pain Location knee  -TAYLOR     Pain Intervention(s) Ambulation/increased activity; Repositioned; Cold applied  -TAYLOR     Row Name 11/08/21 1305          Therapy Assessment/Plan (PT)    Patient/Family Therapy Goals Statement (PT) To return home  -TAYLOR     Rehab Potential (PT) good, to achieve stated therapy goals  -TAYLOR     Criteria for Skilled Interventions Met (PT) yes; meets criteria; skilled treatment is necessary  -TAYLOR     Row Name 11/08/21 1305          Positioning and Restraints    Pre-Treatment Position in bed  -TAYLOR     Post Treatment Position chair  -TAYLOR     In Chair notified nsg; reclined; call light within reach; encouraged to call for assist; exit alarm on; with family/caregiver; legs elevated; compression device  -TAYLOR           User Key  (r) = Recorded By, (t) = Taken By, (c) = Cosigned By    Initials Name Provider Type    Shaka Arnold, PT Physical Therapist               Outcome Measures     Row Name 11/08/21 1305 11/08/21 1133       How much help from another person do you currently need...    Turning from your back to your side while in flat bed without using bedrails? 4  -TAYLOR 3  -AC    Moving from lying on back to sitting on the side of a flat bed without bedrails? 4  -TAYLOR 3  -AC    Moving to and from a bed to a chair (including a wheelchair)? 3  -TAYLOR 2  -AC    Standing up from a chair using your arms (e.g., wheelchair, bedside chair)? 3  -TAYLOR 2  -AC    Climbing 3-5 steps with a railing? 3  -TAYLOR 1  -AC    To walk in hospital room? 3  -TAYLOR 1  -AC    AM-PAC 6 Clicks  Score (PT) 20  -TAYLOR 12  -AC    Row Name 11/08/21 1305          PADD    Diagnosis 1  -TAYLOR     Gender 1  -TAYLOR     Age Group 4  -TAYLOR     Gait Distance 1  -TAYLOR     Assist Level 1  -TAYLOR     Home Support 3  -TAYLOR     PADD Score 9  -TAYLOR     Patient Preference home with outpatient rehab  -     Prediction by PADD Score directly home (with home health or out-patient rehab)  -     Row Name 11/08/21 1305          Functional Assessment    Outcome Measure Options AM-PAC 6 Clicks Basic Mobility (PT); PADD  -TAYLOR           User Key  (r) = Recorded By, (t) = Taken By, (c) = Cosigned By    Initials Name Provider Type     Margarita Lew, RN Registered Nurse    Shaka Arnold, JULIET Physical Therapist                             Physical Therapy Education                 Title: PT OT SLP Therapies (Done)     Topic: Physical Therapy (Done)     Point: Mobility training (Done)     Learning Progress Summary           Patient Acceptance, E,D,H, VU by TAYLOR at 11/8/2021 1305    Comment: Educated on safe sequencing with bed mobility, ambulatory/car/toilet transfers, gait, and stair training. Reviewed HEP and knee precautions via handout.   Significant Other Acceptance, E,D,H, VU by TAYLOR at 11/8/2021 1305    Comment: Educated on safe sequencing with bed mobility, ambulatory/car/toilet transfers, gait, and stair training. Reviewed HEP and knee precautions via handout.                   Point: Home exercise program (Done)     Learning Progress Summary           Patient Acceptance, E,D,H, VU by TAYLOR at 11/8/2021 1305    Comment: Educated on safe sequencing with bed mobility, ambulatory/car/toilet transfers, gait, and stair training. Reviewed HEP and knee precautions via handout.   Significant Other Acceptance, E,D,H, VU by TAYLOR at 11/8/2021 1305    Comment: Educated on safe sequencing with bed mobility, ambulatory/car/toilet transfers, gait, and stair training. Reviewed HEP and knee precautions via handout.                   Point: Body mechanics (Done)     Learning  Progress Summary           Patient Acceptance, E,D,H, VU by TAYLOR at 11/8/2021 1305    Comment: Educated on safe sequencing with bed mobility, ambulatory/car/toilet transfers, gait, and stair training. Reviewed HEP and knee precautions via handout.   Significant Other Acceptance, E,D,H, VU by TAYLOR at 11/8/2021 1305    Comment: Educated on safe sequencing with bed mobility, ambulatory/car/toilet transfers, gait, and stair training. Reviewed HEP and knee precautions via handout.                   Point: Precautions (Done)     Learning Progress Summary           Patient Acceptance, E,D,H, VU by TAYLOR at 11/8/2021 1305    Comment: Educated on safe sequencing with bed mobility, ambulatory/car/toilet transfers, gait, and stair training. Reviewed HEP and knee precautions via handout.   Significant Other Acceptance, E,D,H, VU by TAYLOR at 11/8/2021 1305    Comment: Educated on safe sequencing with bed mobility, ambulatory/car/toilet transfers, gait, and stair training. Reviewed HEP and knee precautions via handout.                               User Key     Initials Effective Dates Name Provider Type Discipline     06/16/21 -  Shaka Michael, PT Physical Therapist PT              PT Recommendation and Plan  Planned Therapy Interventions (PT): balance training, bed mobility training, home exercise program, gait training, patient/family education, transfer training, ROM (range of motion), stair training, strengthening  Plan of Care Reviewed With: patient, spouse  Progress: improving  Outcome Summary: PT eval complete. Pt ambulated 360 feet using RW, CGA, and one person to manage equipment. Pt ascended/descended 1 step using RW, CGA, and backwards technique. Gait/stair training limited by nausea. RN notified. STS and toilet transfer performed with CGA. No knee buckling noted. Pt IND with SLR. Will assess R knee AROM POD#1 if pt does not d/c today. Reviewed HEP and knee precautions via handout. Educated on safe car transfers. PADD score = 9.  ADLs assessed, pt does require OT eval tonight. Functionally, pt safe to d/c home with assist today from a PT perspective, pending improvement with nausea. Recommend OPPT.     Time Calculation:    PT Charges     Row Name 11/08/21 1305             Time Calculation    Start Time 1305  -TAYLOR      PT Received On 11/08/21  -TAYLOR      PT Goal Re-Cert Due Date 11/18/21  -TAYLOR              Time Calculation- PT    Total Timed Code Minutes- PT 14 minute(s)  -TAYLOR              Timed Charges    02311 - PT Therapeutic Exercise Minutes 2  -TAYLOR      47107 - Gait Training Minutes  8  -TAYLOR      90957 - PT Therapeutic Activity Minutes 4  -TAYLOR              Untimed Charges    PT Eval/Re-eval Minutes 34  -TAYLOR              Total Minutes    Timed Charges Total Minutes 14  -TAYLOR      Untimed Charges Total Minutes 34  -TAYLOR       Total Minutes 48  -TAYLOR            User Key  (r) = Recorded By, (t) = Taken By, (c) = Cosigned By    Initials Name Provider Type    Shaka Arnold, PT Physical Therapist              Therapy Charges for Today     Code Description Service Date Service Provider Modifiers Qty    32719585613 HC GAIT TRAINING EA 15 MIN 11/8/2021 Shaka Michael, PT GP 1    02249653286 HC PT EVAL LOW COMPLEXITY 3 11/8/2021 Shaka Michael, PT GP 1    42357966375 HC PT THER SUPP EA 15 MIN 11/8/2021 Shaka Michael, PT GP 3          PT G-Codes  Outcome Measure Options: AM-PAC 6 Clicks Basic Mobility (PT), PADD  AM-PAC 6 Clicks Score (PT): 20    Shaka Michael PT  11/8/2021

## 2021-11-08 NOTE — PLAN OF CARE
Problem: Adult Inpatient Plan of Care  Goal: Plan of Care Review  Flowsheets (Taken 11/8/2021 130)  Progress: improving  Plan of Care Reviewed With:   patient   spouse  Outcome Summary: PT eval complete. Pt ambulated 360 feet using RW, CGA, and one person to manage equipment. Pt ascended/descended 1 step using RW, CGA, and backwards technique. Gait/stair training limited by nausea. RN notified. STS and toilet transfer performed with CGA. No knee buckling noted. Pt IND with SLR. Will assess R knee AROM POD#1 if pt does not d/c today. Reviewed HEP and knee precautions via handout. Educated on safe car transfers. PADD score = 9. ADLs assessed, pt does require OT eval tonight. Functionally, pt safe to d/c home with assist today from a PT perspective, pending improvement with nausea. Recommend OPPT.   Goal Outcome Evaluation:  Plan of Care Reviewed With: patient, spouse        Progress: improving  Outcome Summary: PT eval complete. Pt ambulated 360 feet using RW, CGA, and one person to manage equipment. Pt ascended/descended 1 step using RW, CGA, and backwards technique. Gait/stair training limited by nausea. RN notified. STS and toilet transfer performed with CGA. No knee buckling noted. Pt IND with SLR. Will assess R knee AROM POD#1 if pt does not d/c today. Reviewed HEP and knee precautions via handout. Educated on safe car transfers. PADD score = 9. ADLs assessed, pt does require OT eval tonight. Functionally, pt safe to d/c home with assist today from a PT perspective, pending improvement with nausea. Recommend OPPT.

## 2021-11-09 VITALS
RESPIRATION RATE: 18 BRPM | SYSTOLIC BLOOD PRESSURE: 146 MMHG | BODY MASS INDEX: 33.68 KG/M2 | DIASTOLIC BLOOD PRESSURE: 75 MMHG | OXYGEN SATURATION: 98 % | HEART RATE: 86 BPM | WEIGHT: 183 LBS | TEMPERATURE: 97.7 F | HEIGHT: 62 IN

## 2021-11-09 PROBLEM — R55 SYNCOPE: Status: ACTIVE | Noted: 2021-11-09

## 2021-11-09 PROBLEM — G89.18 POSTOPERATIVE PAIN: Status: ACTIVE | Noted: 2021-11-09

## 2021-11-09 LAB
ANION GAP SERPL CALCULATED.3IONS-SCNC: 8 MMOL/L (ref 5–15)
BASOPHILS # BLD AUTO: 0.01 10*3/MM3 (ref 0–0.2)
BASOPHILS NFR BLD AUTO: 0.1 % (ref 0–1.5)
BUN SERPL-MCNC: 14 MG/DL (ref 8–23)
BUN/CREAT SERPL: 19.7 (ref 7–25)
CALCIUM SPEC-SCNC: 8 MG/DL (ref 8.6–10.5)
CHLORIDE SERPL-SCNC: 107 MMOL/L (ref 98–107)
CO2 SERPL-SCNC: 25 MMOL/L (ref 22–29)
CREAT SERPL-MCNC: 0.71 MG/DL (ref 0.57–1)
DEPRECATED RDW RBC AUTO: 45.2 FL (ref 37–54)
EOSINOPHIL # BLD AUTO: 0.01 10*3/MM3 (ref 0–0.4)
EOSINOPHIL NFR BLD AUTO: 0.1 % (ref 0.3–6.2)
ERYTHROCYTE [DISTWIDTH] IN BLOOD BY AUTOMATED COUNT: 13 % (ref 12.3–15.4)
GFR SERPL CREATININE-BSD FRML MDRD: 83 ML/MIN/1.73
GLUCOSE SERPL-MCNC: 103 MG/DL (ref 65–99)
HCT VFR BLD AUTO: 35.2 % (ref 34–46.6)
HGB BLD-MCNC: 11.4 G/DL (ref 12–15.9)
IMM GRANULOCYTES # BLD AUTO: 0.04 10*3/MM3 (ref 0–0.05)
IMM GRANULOCYTES NFR BLD AUTO: 0.4 % (ref 0–0.5)
LYMPHOCYTES # BLD AUTO: 1.59 10*3/MM3 (ref 0.7–3.1)
LYMPHOCYTES NFR BLD AUTO: 14.9 % (ref 19.6–45.3)
MCH RBC QN AUTO: 30.9 PG (ref 26.6–33)
MCHC RBC AUTO-ENTMCNC: 32.4 G/DL (ref 31.5–35.7)
MCV RBC AUTO: 95.4 FL (ref 79–97)
MONOCYTES # BLD AUTO: 0.93 10*3/MM3 (ref 0.1–0.9)
MONOCYTES NFR BLD AUTO: 8.7 % (ref 5–12)
NEUTROPHILS NFR BLD AUTO: 75.8 % (ref 42.7–76)
NEUTROPHILS NFR BLD AUTO: 8.07 10*3/MM3 (ref 1.7–7)
NRBC BLD AUTO-RTO: 0 /100 WBC (ref 0–0.2)
PLATELET # BLD AUTO: 231 10*3/MM3 (ref 140–450)
PMV BLD AUTO: 10.4 FL (ref 6–12)
POTASSIUM SERPL-SCNC: 3.6 MMOL/L (ref 3.5–5.2)
RBC # BLD AUTO: 3.69 10*6/MM3 (ref 3.77–5.28)
SODIUM SERPL-SCNC: 140 MMOL/L (ref 136–145)
WBC # BLD AUTO: 10.65 10*3/MM3 (ref 3.4–10.8)

## 2021-11-09 PROCEDURE — 97116 GAIT TRAINING THERAPY: CPT

## 2021-11-09 PROCEDURE — 97165 OT EVAL LOW COMPLEX 30 MIN: CPT

## 2021-11-09 PROCEDURE — 85025 COMPLETE CBC W/AUTO DIFF WBC: CPT | Performed by: ORTHOPAEDIC SURGERY

## 2021-11-09 PROCEDURE — 97110 THERAPEUTIC EXERCISES: CPT

## 2021-11-09 PROCEDURE — 94799 UNLISTED PULMONARY SVC/PX: CPT

## 2021-11-09 PROCEDURE — 80048 BASIC METABOLIC PNL TOTAL CA: CPT | Performed by: INTERNAL MEDICINE

## 2021-11-09 PROCEDURE — 97535 SELF CARE MNGMENT TRAINING: CPT

## 2021-11-09 RX ADMIN — POLYETHYLENE GLYCOL 3350 17 G: 17 POWDER, FOR SOLUTION ORAL at 08:37

## 2021-11-09 RX ADMIN — ACETAMINOPHEN 1000 MG: 500 TABLET, FILM COATED ORAL at 04:47

## 2021-11-09 RX ADMIN — ASPIRIN 325 MG ORAL TABLET 325 MG: 325 PILL ORAL at 08:36

## 2021-11-09 RX ADMIN — PROPRANOLOL HYDROCHLORIDE 20 MG: 20 TABLET ORAL at 08:36

## 2021-11-09 RX ADMIN — ACETAMINOPHEN 1000 MG: 500 TABLET, FILM COATED ORAL at 11:39

## 2021-11-09 RX ADMIN — PANTOPRAZOLE SODIUM 40 MG: 40 TABLET, DELAYED RELEASE ORAL at 04:47

## 2021-11-09 RX ADMIN — MELOXICAM 15 MG: 7.5 TABLET ORAL at 08:37

## 2021-11-09 NOTE — THERAPY DISCHARGE NOTE
Acute Care - Occupational Therapy Discharge  [facility]    Patient Name: Stella Edmonds  : 1955    MRN: [id number]                              Today's Date: 2021       Admit Date: 2021    Visit Dx:     ICD-10-CM ICD-9-CM   1. S/P total knee arthroplasty, right  Z96.651 V43.65   2. Arthritis of right knee  M17.11 716.96     Patient Active Problem List   Diagnosis   • Well female exam with routine gynecological exam   • Menopausal state   • Dyspareunia   • Submucous leiomyoma of uterus   • Spinal stenosis in cervical region   • Fatigue   • Abnormal EKG   • Hyperlipidemia   • Hypertension   • AVM (arteriovenous malformation)   • Migraines   • Varicose vein   • Hypothyroidism   • GERD (gastroesophageal reflux disease)   • Obesity (BMI 30.0-34.9)   • Cervical stenosis (uterine cervix)   • Abnormal uterine bleeding (AUB)   • Herniated lumbar disc without myelopathy   • Lumbar radiculopathy   • Lumbar radiculopathy, acute   • Arthritis of right knee   • S/P total knee arthroplasty, right     Past Medical History:   Diagnosis Date   • Abnormal EKG     History of abnormal EKG: Myocardial perfusion study, 2009:  Reduced exercise tolerance, no evidence of inducible ischemia, normal LV systolic function.    • Abnormal leg movement     Probable restless leg syndrome.   • Asthma    • AVM (arteriovenous malformation)     Frontal AV malformation with removal by Dr. Sarmiento, .    • Back pain    • Esophageal dysmotility    • Fatigue      She reports that she is feeling less fatigued off her diuretic. Lightheadedness and dizziness; improved.   • Gastritis    • GERD (gastroesophageal reflux disease)    • Globus sensation    •  1 para 1    • Hiatal hernia    • History of hepatitis A     Age 9   • Hot flashes    • Hyperlipidemia    • Hypertension    • Hypothyroidism    • Lower extremity edema     Mild.She did state that she does have some increase in her lower extremity edema, and Dr. Willett  discussed with her that she may take hydrochlorothiazide 12.5 or half a tablet on an as needed basis.   • Migraines     Migraines, treated with propranolol.   • Obesity (BMI 30.0-34.9)     BMI 33.5   • PONV (postoperative nausea and vomiting)    • Reflux esophagitis    • Schatzki's ring    • Seizure (HCC)     last seizure - 2 years ago   • UTI (urinary tract infection)     History of UTIs   • Varicose vein     Varicosities   • Wears eyeglasses      Past Surgical History:   Procedure Laterality Date   • BREAST BIOPSY Right 07/10/2017    stereo bx   • BREAST BIOPSY Left 02/02/2018    stereo bx   • BREAST BIOPSY Right 02/2019    stereo   • COLONOSCOPY      8/2017   • ESOPHAGEAL DILATATION     • HYSTERECTOMY      total 1997   • LUMBAR DISCECTOMY Left 10/4/2017    Procedure: LUMBAR DISCECTOMY MICRO ENDOSCOPIC L5-S1 RIGHT;  Surgeon: Pancho Au MD;  Location: [facility];  Service:    • OOPHORECTOMY  1997   • OTHER SURGICAL HISTORY  08/07/2015    Prior right frontal craniotomy for Spetzler-Chucky grade I arteriovenous malformation.-DR. AU/DR. BUNDY   • OTHER SURGICAL HISTORY  2015    Frontal AV malformation with removal, Dr. Au, 2015.   • RENAL ARTERY STENT      pt denies   • UPPER GASTROINTESTINAL ENDOSCOPY  11/02/2005      General Information     Row Name 11/09/21 0843          OT Time and Intention    Document Type discharge evaluation/summary; discharge treatment  -TB     Mode of Treatment occupational therapy; individual therapy  -TB     Row Name 11/09/21 0843          General Information    Patient Profile Reviewed yes  -TB     Prior Level of Function min assist:; all household mobility; transfer; ADL's  Increased pain and effort all activities  -TB     Existing Precautions/Restrictions fall; other (see comments)  AC block, TIERRA dressing  -TB     Barriers to Rehab none identified  -TB     Row Name 11/09/21 0843          Occupational Profile    Reason for Services/Referral (Occupational Profile) to  advance occupational independence  -TB     Environmental Supports and Barriers (Occupational Profile) Supportive spouse, motivated patient, Tub/shower with seat and grab bars, ETS, RW  -TB     Row Name 11/09/21 0843          Living Environment    Lives With spouse; child(kerry), adult  Adult grandson  -TB     Row Name 11/09/21 0843          Home Main Entrance    Number of Stairs, Main Entrance one  -TB     Stair Railings, Main Entrance none  -TB     Row Name 11/09/21 0843          Stairs Within Home, Primary    Number of Stairs, Within Home, Primary none  -TB     Row Name 11/09/21 0843          Cognition    Orientation Status (Cognition) oriented x 4  -TB     Row Name 11/09/21 0843          Safety Issues, Functional Mobility    Safety Issues Affecting Function (Mobility) safety precautions follow-through/compliance; safety precaution awareness; impulsivity  -TB     Impairments Affecting Function (Mobility) pain; strength; range of motion (ROM); endurance/activity tolerance  -TB     Comment, Safety Issues/Impairments (Mobility) Pt up with RW support and CGAx1. Education and cues given for RW positioning and safety.  -TB     Row Name 11/09/21 0843          Relationship/Environment    Name(s) of Who Lives With Patient   -TB           User Key  (r) = Recorded By, (t) = Taken By, (c) = Cosigned By    Initials Name Provider Type    TB Sagrario Landin OT Occupational Therapist               Mobility/ADL's     Row Name 11/09/21 0845          Bed Mobility    Comment (Bed Mobility) UIC  -TB     Row Name 11/09/21 0845          Transfers    Transfers sit-stand transfer  -TB     Comment (Transfers) Education and cues for hand placement, sequencing, and safety.  -TB     Sit-Stand [place] (Transfers) contact guard; verbal cues  -TB     Row Name 11/09/21 0845          Sit-Stand Transfer    Assistive Device (Sit-Stand Transfers) walker, front-wheeled  -TB     Row Name 11/09/21 0845          Functional Mobility     Functional Mobility- Ind. Level contact guard assist  -TB     Functional Mobility- Device rolling walker  -TB     Functional Mobility-Distance (Feet) 75  -TB     Functional Mobility- Safety Issues step length decreased; weight-shifting ability decreased; balance decreased during turns  -TB     Functional Mobility- Comment Education and cues for RW positioning and safety  -TB     Row Name 11/09/21 0845          Activities of Daily Living    BADL Assessment/Intervention bathing; lower body dressing  -     Row Name 11/09/21 0845          Mobility    Extremity Weight-bearing Status right lower extremity  -TB     Right Lower Extremity (Weight-bearing Status) weight-bearing as tolerated (WBAT)  -TB     Row Name 11/09/21 0845          Bathing Assessment/Intervention    [place] Level (Bathing) set up; distal lower extremities/feet  -TB     Position (Bathing) unsupported sitting  -TB     Comment (Bathing) Education completed for ADL retraining.  -     Row Name 11/09/21 0845          Lower Body Dressing Assessment/Training    [place] Level (Lower Body Dressing) don; shoes/slippers; minimum assist (75% patient effort); verbal cues  -TB     Position (Lower Body Dressing) unsupported sitting  -TB     Comment (Lower Body Dressing) Education completed for ADL training. Pt able to don shoes without need for AE.  -TB           User Key  (r) = Recorded By, (t) = Taken By, (c) = Cosigned By    Initials Name Provider Type    TB Sagrario Landin OT Occupational Therapist               Obj/Interventions     Row Name 11/09/21 0848          Sensory Assessment (Somatosensory)    Sensory Assessment (Somatosensory) UE sensation intact  -     Row Name 11/09/21 0848          Vision Assessment/Intervention    Visual Impairment/Limitations corrective lenses for reading  -     Row Name 11/09/21 0848          Range of Motion Comprehensive    General Range of Motion bilateral upper extremity ROM WNL  -TB     Comment, General  Range of Motion h/o L Shoulder repair with excellent recovery  -TB     Row Name 11/09/21 0848          Strength Comprehensive (MMT)    General Manual Muscle Testing (MMT) Assessment no strength deficits identified  -TB     Comment, General Manual Muscle Testing (MMT) Assessment BUE WFL for ADL performance and AD use  -     Row Name 11/09/21 0848          Balance    Balance Assessment sitting dynamic balance; sit to stand dynamic balance; standing dynamic balance  -     Dynamic Sitting Balance WNL; unsupported; sitting in chair  -TB     Sit to Stand Dynamic Balance mild impairment; supported; standing  -TB     Dynamic Standing Balance mild impairment; supported; standing  -TB     Balance Interventions sitting; standing; sit to stand; supported; dynamic; occupation based/functional task; UE activity with balance activity; weight shifting activity  -TB     Comment, Balance Pt up with RW support and CGAx1. Education and cues given for RW positioning and safety.  -TB           User Key  (r) = Recorded By, (t) = Taken By, (c) = Cosigned By    Initials Name Provider Type    Sagrario Finley, OT Occupational Therapist               Goals/Plan    No documentation.                Clinical Impression     Row Name 11/09/21 0849          Pain Assessment    Additional Documentation Pain Scale: Numbers Pre/Post-Treatment (Group)  -TB     [facility] Name 11/09/21 0849          Pain Scale: Numbers Pre/Post-Treatment    Pretreatment Pain Rating 4/10  -TB     Posttreatment Pain Rating 5/10  -TB     Pain Location - Side Right  -TB     Pain Location - Orientation anterior  -TB     Pain Location knee  -TB     Pre/Posttreatment Pain Comment Pt tolerates dynamic OOB activity well  -TB     Pain Intervention(s) Ambulation/increased activity; Repositioned; Cold applied  RLE AC block  -     Row Name 11/09/21 0849          Plan of Care Review    Plan of Care Reviewed With patient  -TB     Outcome Summary OT IE completed. Pt is A&Ox4.  BUE AROM, strength, and sensation intact. Presents with mild impulsivity requiring cues for safety/fall prevention. Pt up in room and transfering with RW support and CGAx1. Education completed for ADL retraining. Min A LB dressing to don shoes. No AE needed. Pt denies DME needs. OT will d/c at this time. Anticipate d/c to home today following PT. Pt reports spouse available to assist as needed.  -TB     Row Name 11/09/21 0849          Therapy Assessment/Plan (OT)    Therapy Frequency (OT) evaluation only  -TB     Row Name 11/09/21 0849          Therapy Plan Review/Discharge Plan (OT)    Equipment Needs Upon Discharge (OT) --  None  -TB     Anticipated Discharge Disposition (OT) home with assist; home with home health  -TB     Row Name 11/09/21 0849          Vital Signs    Pre Systolic BP Rehab --  RN cleared OT  -TB     O2 Delivery Pre Treatment room air  -TB     O2 Delivery Intra Treatment room air  -TB     O2 Delivery Post Treatment room air  -TB     Pre Patient Position Sitting  -TB     Intra Patient Position Standing  -TB     Post Patient Position Sitting  -TB     Row Name 11/09/21 0849          Positioning and Restraints    Pre-Treatment Position sitting in chair/recliner  -TB     Post Treatment Position chair  -TB     In Chair notified nsg; reclined; call light within reach; encouraged to call for assist; legs elevated  -TB           User Key  (r) = Recorded By, (t) = Taken By, (c) = Cosigned By    Initials Name Provider Type    TB Sagrario Landin, OT Occupational Therapist               Outcome Measures     Row Name 11/09/21 0856          How much help from another is currently needed...    Putting on and taking off regular lower body clothing? 3  -TB     Bathing (including washing, rinsing, and drying) 3  -TB     Toileting (which includes using toilet bed pan or urinal) 3  -TB     Putting on and taking off regular upper body clothing 4  -TB     Taking care of personal grooming (such as brushing teeth)  3  -TB     Eating meals 4  -TB     AM-PAC 6 Clicks Score (OT) 20  -TB     Row Name 11/09/21 0856          Functional Assessment    Outcome Measure Options AM-PAC 6 Clicks Daily Activity (OT)  -TB           User Key  (r) = Recorded By, (t) = Taken By, (c) = Cosigned By    Initials Name Provider Type    TB Sagrario Landin OT Occupational Therapist              Occupational Therapy Education                 Title: PT OT SLP Therapies (Done)     Topic: Occupational Therapy (Done)     Point: ADL training (Done)     Description:   Instruct learner(s) on proper safety adaptation and remediation techniques during self care or transfers.   Instruct in proper use of assistive devices.              Learning Progress Summary           Patient Acceptance, E,D, VU,DU by  at 11/9/2021 0859                   Point: Precautions (Done)     Description:   Instruct learner(s) on prescribed precautions during self-care and functional transfers.              Learning Progress Summary           Patient Acceptance, E,D, VU,DU by  at 11/9/2021 0859                               User Key     Initials Effective Dates Name Provider Type [facility] 06/16/21 -  Sagrario Landin OT Occupational Therapist OT              OT Recommendation and Plan  Retired Outcome Summary/Treatment Plan (OT)  Anticipated Discharge Disposition (OT): home with assist, home with home health  Therapy Frequency (OT): evaluation only  Plan of Care Review  Plan of Care Reviewed With: patient  Outcome Summary: OT IE completed. Pt is A&Ox4. BUE AROM, strength, and sensation intact. Presents with mild impulsivity requiring cues for safety/fall prevention. Pt up in room and transfering with RW support and CGAx1. Education completed for ADL retraining. Min A LB dressing to don shoes. No AE needed. Pt denies DME needs. OT will d/c at this time. Anticipate d/c to home today following PT. Pt reports spouse available to assist as needed.  Plan of Care  Reviewed With: patient  Outcome Summary: OT IE completed. Pt is A&Ox4. BUE AROM, strength, and sensation intact. Presents with mild impulsivity requiring cues for safety/fall prevention. Pt up in room and transfering with RW support and CGAx1. Education completed for ADL retraining. Min A LB dressing to don shoes. No AE needed. Pt denies DME needs. OT will d/c at this time. Anticipate d/c to home today following PT. Pt reports spouse available to assist as needed.     Time Calculation:    Time Calculation- OT     Row Name 11/09/21 0745             Time Calculation- OT    OT Start Time 0745  -TB      OT Received On 11/09/21  -TB              Timed Charges    66949 - OT Self Care/Mgmt Minutes 15  -TB              Untimed Charges    OT Eval/Re-eval Minutes 40  -TB              Total Minutes    Timed Charges Total Minutes 15  -TB      Untimed Charges Total Minutes 40  -TB       Total Minutes 55  -TB            User Key  (r) = Recorded By, (t) = Taken By, (c) = Cosigned By    Initials Name Provider Type    TB Sagrario Landin OT Occupational Therapist              Therapy Charges for Today     Code Description Service Date Service Provider Modifiers Qty    [id number]  OT SELF CARE/MGMT/TRAIN EA 15 MIN 11/9/2021 Sagrario Landin OT GO 1    [id number]  OT EVAL LOW COMPLEXITY 3 11/9/2021 Sagrario Landin OT GO 1               Sagrario Landin OT  11/9/2021

## 2021-11-09 NOTE — PLAN OF CARE
A&Ox4, mood/affect appropriate. Speech clear/logical. Foam/tegaderm dressing to the right knee CDI. VSS. Patient is currently resting in bed without sx/si of pain/acute distress. Will cont to mx. Call light in reach.

## 2021-11-09 NOTE — THERAPY DISCHARGE NOTE
Patient Name: Stella Edmonds  : 1955    MRN: [id number]                              Today's Date: 2021       Admit Date: 2021    Visit Dx:     ICD-10-CM ICD-9-CM   1. S/P total knee arthroplasty, right  Z96.651 V43.65   2. Arthritis of right knee  M17.11 716.96     Patient Active Problem List   Diagnosis   • Well female exam with routine gynecological exam   • Menopausal state   • Dyspareunia   • Submucous leiomyoma of uterus   • Spinal stenosis in cervical region   • Fatigue   • Abnormal EKG   • Hyperlipidemia   • Hypertension   • AVM (arteriovenous malformation)   • Migraines   • Varicose vein   • Hypothyroidism   • GERD (gastroesophageal reflux disease)   • Obesity (BMI 30.0-34.9)   • Cervical stenosis (uterine cervix)   • Abnormal uterine bleeding (AUB)   • Herniated lumbar disc without myelopathy   • Lumbar radiculopathy   • Lumbar radiculopathy, acute   • Arthritis of right knee   • S/P total knee arthroplasty, right     Past Medical History:   Diagnosis Date   • Abnormal EKG     History of abnormal EKG: Myocardial perfusion study, 2009:  Reduced exercise tolerance, no evidence of inducible ischemia, normal LV systolic function.    • Abnormal leg movement     Probable restless leg syndrome.   • Asthma    • AVM (arteriovenous malformation)     Frontal AV malformation with removal by Dr. Sarmiento, .    • Back pain    • Esophageal dysmotility    • Fatigue      She reports that she is feeling less fatigued off her diuretic. Lightheadedness and dizziness; improved.   • Gastritis    • GERD (gastroesophageal reflux disease)    • Globus sensation    •  1 para 1    • Hiatal hernia    • History of hepatitis A     Age 9   • Hot flashes    • Hyperlipidemia    • Hypertension    • Hypothyroidism    • Lower extremity edema     Mild.She did state that she does have some increase in her lower extremity edema, and Dr. Willett discussed with her that she may take hydrochlorothiazide  12.5 or half a tablet on an as needed basis.   • Migraines     Migraines, treated with propranolol.   • Obesity (BMI 30.0-34.9)     BMI 33.5   • PONV (postoperative nausea and vomiting)    • Reflux esophagitis    • Schatzki's ring    • Seizure (HCC)     last seizure - 2 years ago   • UTI (urinary tract infection)     History of UTIs   • Varicose vein     Varicosities   • Wears eyeglasses      Past Surgical History:   Procedure Laterality Date   • BREAST BIOPSY Right 07/10/2017    stereo bx   • BREAST BIOPSY Left 02/02/2018    stereo bx   • BREAST BIOPSY Right 02/2019    stereo   • COLONOSCOPY      8/2017   • ESOPHAGEAL DILATATION     • HYSTERECTOMY      total 1997   • LUMBAR DISCECTOMY Left 10/4/2017    Procedure: LUMBAR DISCECTOMY MICRO ENDOSCOPIC L5-S1 RIGHT;  Surgeon: Pancho Au MD;  Location: [facility];  Service:    • OOPHORECTOMY  1997   • OTHER SURGICAL HISTORY  08/07/2015    Prior right frontal craniotomy for Spetzler-Chucky grade I arteriovenous malformation.-DR. AU/DR. BUNDY   • OTHER SURGICAL HISTORY  2015    Frontal AV malformation with removal, Dr. Au, 2015.   • RENAL ARTERY STENT      pt denies   • UPPER GASTROINTESTINAL ENDOSCOPY  11/02/2005      General Information     Row Name 11/09/21 0948          Physical Therapy Time and Intention    Document Type discharge treatment  -SC     Mode of Treatment physical therapy  -SC     Row Name 11/09/21 0948          General Information    Patient Profile Reviewed yes  -SC     Existing Precautions/Restrictions fall; other (see comments)  none  -SC     Row Name 11/09/21 0948          Cognition    Orientation Status (Cognition) oriented x 4  -SC     Row Name 11/09/21 0948          Safety Issues, Functional Mobility    Impairments Affecting Function (Mobility) pain; strength; range of motion (ROM); endurance/activity tolerance  -SC     Comment, Safety Issues/Impairments (Mobility) alert, following commands  -SC           User Key  (r) = Recorded By,  (t) = Taken By, (c) = Cosigned By    Initials Name Provider Type    SC Serenity Madrid, PT Physical Therapist               Mobility     Row Name 11/09/21 0948          Bed Mobility    Comment (Bed Mobility) UIC  -SC     Row Name 11/09/21 0948          Transfers    Comment (Transfers) demonstrated safe transfrers with good hand placement  -SC     Row Name 11/09/21 0948          Sit-Stand Transfer    Sit-Stand [place] (Transfers) modified independence  -SC     Assistive Device (Sit-Stand Transfers) walker, front-wheeled  -SC     Row Name 11/09/21 0948          Gait/Stairs (Locomotion)    [place] Level (Gait) modified independence; verbal cues  -SC     Distance in Feet (Gait) 250  -SC     Deviations/Abnormal Patterns (Gait) right sided deviations; antalgic; weight shifting decreased; stride length decreased; monique decreased  -SC     Bilateral Gait Deviations forward flexed posture  -SC     Number of Steps (Stairs) 1  -SC     Comment (Gait/Stairs) GT training focused on achieving step through gait pattern with uprigth posture. Required cues for posture and to stay closer to walker. She was able to go up/down one step with walker backwards  -SC     Row Name 11/09/21 0948          Mobility    Extremity Weight-bearing Status right lower extremity  -SC     Right Lower Extremity (Weight-bearing Status) weight-bearing as tolerated (WBAT)  -SC           User Key  (r) = Recorded By, (t) = Taken By, (c) = Cosigned By    Initials Name Provider Type    SC Serenity Madrid PT Physical Therapist               Obj/Interventions     Row Name 11/09/21 0951          Range of Motion Comprehensive    Comment, General Range of Motion R  10-80 deg  -[facility] Name 11/09/21 0951          Knee (Therapeutic Exercise)    Knee (Therapeutic Exercise) AROM (active range of motion)  -SC     Knee AROM (Therapeutic Exercise) right; flexion; extension; SAQ (short arc quad); LAQ (long arc quad); heel slides; sitting; supine; 10  repetitions  -SC     Knee Isometrics (Therapeutic Exercise) right; quad sets; 10 repetitions  -[facility] Name 11/09/21 0951          Ankle (Therapeutic Exercise)    Ankle (Therapeutic Exercise) AROM (active range of motion)  -SC     Ankle AROM (Therapeutic Exercise) bilateral; dorsiflexion; plantarflexion; 10 repetitions  -[facility] Name 11/09/21 0951          Balance    Dynamic Standing Balance supported; WFL  -SC     Comment, Balance has walker  -SC           User Key  (r) = Recorded By, (t) = Taken By, (c) = Cosigned By    Initials Name Provider Type    SC Serenity Madrid A, PT Physical Therapist               Goals/Plan     Row Name 11/09/21 0955          Bed Mobility Goal 1 (PT)    Activity/Assistive Device (Bed Mobility Goal 1, PT) sit to supine/supine to sit  -SC     [place] Level/Cues Needed (Bed Mobility Goal 1, PT) modified independence  -SC     Time Frame (Bed Mobility Goal 1, PT) long term goal (LTG); 3 days  -SC     Progress/Outcomes (Bed Mobility Goal 1, PT) goal met  -[facility] Name 11/09/21 0955          Transfer Goal 1 (PT)    Activity/Assistive Device (Transfer Goal 1, PT) sit-to-stand/stand-to-sit; walker, rolling  -SC     [place] Level/Cues Needed (Transfer Goal 1, PT) modified independence  -SC     Time Frame (Transfer Goal 1, PT) long term goal (LTG); 3 days  -SC     Progress/Outcome (Transfer Goal 1, PT) goal met  -[facility] Name 11/09/21 0955          Gait Training Goal 1 (PT)    Activity/Assistive Device (Gait Training Goal 1, PT) gait (walking locomotion); walker, rolling  -SC     [place] Level (Gait Training Goal 1, PT) modified independence  -SC     Distance (Gait Training Goal 1, PT) 500 feet  -SC     Time Frame (Gait Training Goal 1, PT) long term goal (LTG); 3 days  -SC     Progress/Outcome (Gait Training Goal 1, PT) goal partially met  -[facility] Name 11/09/21 0955          ROM Goal 1 (PT)    ROM Goal 1 (PT) R knee AROM 0-90 degrees  -SC     Time Frame (ROM Goal 1,  PT) long-term goal (LTG); 3 days  -SC     Progress/Outcome (ROM Goal 1, PT) goal not met  -SC     Row Name 11/09/21 0955          Stairs Goal 1 (PT)    Activity/Assistive Device (Stairs Goal 1, PT) stairs, all skills; walker, rolling  -SC     [place] Level/Cues Needed (Stairs Goal 1, PT) modified independence  -SC     Number of Stairs (Stairs Goal 1, PT) 1  -SC     Time Frame (Stairs Goal 1, PT) long term goal (LTG); 3 days  -SC     Progress/Outcome (Stairs Goal 1, PT) goal met  -SC           User Key  (r) = Recorded By, (t) = Taken By, (c) = Cosigned By    Initials Name Provider Type    SC Serenity Madrid, PT Physical Therapist               Clinical Impression     [facility] Name 11/09/21 0953          Pain    Additional Documentation Pain Scale: Numbers Pre/Post-Treatment (Group)  -SC     Row Name 11/09/21 0953          Pain Scale: Numbers Pre/Post-Treatment    Pretreatment Pain Rating 8/10  -SC     Posttreatment Pain Rating 8/10  -SC     Pain Location - Side Right  -SC     Pain Location knee  -SC     Pain Intervention(s) Medication (See MAR)  -[facility] Name 11/09/21 0953          Plan of Care Review    Plan of Care Reviewed With patient  -SC     Progress improving  -SC     Outcome Summary Patient demonstrated safe mobility foe 250 feet with walker. Her pain limited her ROM 10-80 deg  R knee. She is going home with home health today.  -[facility] Name 11/09/21 0953          Therapy Assessment/Plan (PT)    Patient/Family Therapy Goals Statement (PT) go home  -SC     Rehab Potential (PT) good, to achieve stated therapy goals  -SC     Criteria for Skilled Interventions Met (PT) yes; meets criteria; skilled treatment is necessary  -[facility] Name 11/09/21 0953          Positioning and Restraints    Pre-Treatment Position sitting in chair/recliner  -SC     Post Treatment Position chair  -SC     In Chair notified nsg; reclined; sitting; exit alarm on; encouraged to call for assist; call light within reach  -SC            User Key  (r) = Recorded By, (t) = Taken By, (c) = Cosigned By    Initials Name Provider Type    SC Serenity Madrid PT Physical Therapist               Outcome Measures     Row Name 11/09/21 0955 11/09/21 0836       How much help from another person do you currently need...    Turning from your back to your side while in flat bed without using bedrails? 4  -SC 4  -MM    Moving from lying on back to sitting on the side of a flat bed without bedrails? 4  -SC 4  -MM    Moving to and from a bed to a chair (including a wheelchair)? 4  -SC 3  -MM    Standing up from a chair using your arms (e.g., wheelchair, bedside chair)? 4  -SC 3  -MM    Climbing 3-5 steps with a railing? 3  -SC 3  -MM    To walk in hospital room? 3  -SC 3  -MM    AM-PAC 6 Clicks Score (PT) 22  -SC 20  -MM    Row Name 11/09/21 0955 11/09/21 0856       Functional Assessment    Outcome Measure Options AM-PAC 6 Clicks Basic Mobility (PT)  -SC AM-PAC 6 Clicks Daily Activity (OT)  -TB          User Key  (r) = Recorded By, (t) = Taken By, (c) = Cosigned By    Initials Name Provider Type    Serenity [place] PT Physical Therapist    Sagrario Finley, OT Occupational Therapist    Ban Buck RN Registered Nurse              Physical Therapy Education                 Title: PT OT SLP Therapies (Done)     Topic: Physical Therapy (Done)     Point: Mobility training (Done)     Learning Progress Summary           Patient Eager, GEO,TB,D,H, VU,DU by SC at 11/9/2021 0956    Comment: reviewed HEP    Acceptance, E,D,H, VU by TAYLOR at 11/8/2021 1305    Comment: Educated on safe sequencing with bed mobility, ambulatory/car/toilet transfers, gait, and stair training. Reviewed HEP and knee precautions via handout.   Significant Other Acceptance, E,D,H, VU by  at 11/8/2021 1305    Comment: Educated on safe sequencing with bed mobility, ambulatory/car/toilet transfers, gait, and stair training. Reviewed HEP and knee precautions via handout.                    Point: Home exercise program (Done)     Learning Progress Summary           Patient Eager, E,TB,D,H, VU,DU by SC at 11/9/2021 0956    Comment: reviewed HEP    Acceptance, E,D,H, VU by TAYLOR at 11/8/2021 1305    Comment: Educated on safe sequencing with bed mobility, ambulatory/car/toilet transfers, gait, and stair training. Reviewed HEP and knee precautions via handout.   Significant Other Acceptance, E,D,H, VU by TAYLOR at 11/8/2021 1305    Comment: Educated on safe sequencing with bed mobility, ambulatory/car/toilet transfers, gait, and stair training. Reviewed HEP and knee precautions via handout.                   Point: Body mechanics (Done)     Learning Progress Summary           Patient Eager, E,TB,D,H, VU,DU by SC at 11/9/2021 0956    Comment: reviewed HEP    Acceptance, E,D,H, VU by TAYLOR at 11/8/2021 1305    Comment: Educated on safe sequencing with bed mobility, ambulatory/car/toilet transfers, gait, and stair training. Reviewed HEP and knee precautions via handout.   Significant Other Acceptance, E,D,H, VU by TAYLOR at 11/8/2021 1305    Comment: Educated on safe sequencing with bed mobility, ambulatory/car/toilet transfers, gait, and stair training. Reviewed HEP and knee precautions via handout.                   Point: Precautions (Done)     Learning Progress Summary           Patient Eager, E,TB,D,H, VU,DU by SC at 11/9/2021 0956    Comment: reviewed HEP    Acceptance, E,D,H, VU by TAYLOR at 11/8/2021 1305    Comment: Educated on safe sequencing with bed mobility, ambulatory/car/toilet transfers, gait, and stair training. Reviewed HEP and knee precautions via handout.   Significant Other Acceptance, E,D,H, VU by TAYLOR at 11/8/2021 1305    Comment: Educated on safe sequencing with bed mobility, ambulatory/car/toilet transfers, gait, and stair training. Reviewed HEP and knee precautions via handout.                               User Key     Initials Effective Dates Name Provider Type Discipline    SC 06/16/21 -  Julius,  Serenity MARTINEZ PT Physical Therapist PT    TAYLOR 06/16/21 -  Shaka Michael PT Physical Therapist PT              PT Recommendation and Plan     Plan of Care Reviewed With: patient  Progress: improving  Outcome Summary: Patient demonstrated safe mobility foe 250 feet with walker. Her pain limited her ROM 10-80 deg  R knee. She is going home with home health today.     Time Calculation:    PT Charges     Row Name 11/09/21 0830             Time Calculation    Start Time 0830  -SC      PT Received On 11/09/21  -SC              Time Calculation- PT    Total Timed Code Minutes- PT 30 minute(s)  -SC              Timed Charges    68823 - PT Therapeutic Exercise Minutes 15  -SC      19186 - Gait Training Minutes  15  -SC              Total Minutes    Timed Charges Total Minutes 30  -SC       Total Minutes 30  -SC            User Key  (r) = Recorded By, (t) = Taken By, (c) = Cosigned By    Initials Name Provider Type    SC Serenity Madrid PT Physical Therapist              Therapy Charges for Today     Code Description Service Date Service Provider Modifiers Qty    89709068009 HC PT THER PROC EA 15 MIN 11/9/2021 Serenity Madrid, PT GP 1    55805803310 HC GAIT TRAINING EA 15 MIN 11/9/2021 Serenity Madrid PT GP 1          PT G-Codes  Outcome Measure Options: AM-PAC 6 Clicks Basic Mobility (PT)  AM-PAC 6 Clicks Score (PT): 22  AM-PAC 6 Clicks Score (OT): 20    PT Discharge Summary  Anticipated Discharge Disposition (PT): home with home health    Serenity Madrid PT  11/9/2021

## 2021-11-09 NOTE — PLAN OF CARE
Problem: Adult Inpatient Plan of Care  Goal: Plan of Care Review  Recent Flowsheet Documentation  Taken 11/9/2021 0849 by Sagrario Landin OT  Plan of Care Reviewed With: patient  Outcome Summary: OT IE completed. Pt is A&Ox4. BUE AROM, strength, and sensation intact. Presents with mild impulsivity requiring cues for safety/fall prevention. Pt up in room and transfering with RW support and CGAx1. Education completed for ADL retraining. Min A LB dressing to don shoes. No AE needed. Pt denies DME needs. OT will d/c at this time. Anticipate d/c to home today following PT. Pt reports spouse available to assist as needed.

## 2021-11-09 NOTE — PLAN OF CARE
Goal Outcome Evaluation:  Plan of Care Reviewed With: patient, spouse    Poc pt has met exceeded for treatment to go home with out pt PT beginning Wed. 11/10 at 1300 appt made by CM . Pt has viewed video on continuous nerve cath infusion spouse bedside will be providing  transportation.

## 2021-11-09 NOTE — PROGRESS NOTES
[place]    Acute pain service Inpatient Progress Note    Patient Name: Stella Edmonds  :  1955  MRN:  [id number]        Acute Pain  Service Inpatient Progress Note:    Analgesia:Fair  Pain Score:6/10  LOC: alert and awake  Side Effects:None  Catheter Site:clean, dry and dressing intact  Cath type: peripheral nerve cath with ON Q  Infusion rate: 10ml/hr  Dosing/Volume: ropivacaine 0.2% (10ml)  Catheter Plan:Catheter to remain Insitu, Continue catheter infusion rate unchanged, Bolus Catheter and Patient to be discharged home

## 2021-11-09 NOTE — DISCHARGE SUMMARY
Patient Name: Stelal Edmonds  MRN: [id number]  : 1955  DOS: 2021    Attending: Turner Lira MD    Primary Care Provider: Ethan Blanco MD    Date of Admission:.2021  5:29 AM    Date of Discharge:  2021    Discharge Diagnosis:     S/P total knee arthroplasty, right    Hyperlipidemia    Hypertension    GERD (gastroesophageal reflux disease)    Arthritis of right knee      Hospital Course    At admit:  Patient is a pleasant 66 y.o. female presented for scheduled surgery by Dr. Lira.     Per his note (66-year-old with endstage right knee osteoarthritic symptoms and corresponding x-rays showing near complete loss medial joint space.  Left knee has more advanced disease radiographically but symptoms are not as severe.  No sustained relief from reasonable non-operative management.  Risks benefits and indications and rationale for right total knee arthroplasty discussed at length with patient.  Patient is made aware of possible mechanical or infectious complications for TKA as well as possible perioperative medical complication. Patient signs her own consent after all questions are answered.).     Patient underwent right total knee arthroplasty under spinal anesthesia, tolerated surgery well.  Adductor canal nerve block catheter was placed by acute pain service.     She was admitted for the management, when seen in her room afterwards she is doing very well.  Her pain control is adequate.  She has ambulated with physical therapy.  Tolerated p.o. diet.     She has no history of DVT or PE.     Patient and  are very motivated for home discharge later today.        After admit:    Patient was provided pain medications as needed for pain control, along with adductor canal nerve block infusion of Ropivacaine.      Adjustments were made to pain medications to optimize postop pain management. Risks and benefits of opiate medications discussed with patient. REKHA report was reviewed.    She  was seen by PT and OT and has progressed well over her stay.    On postop day 0 patient was doing quite well having ambulated with physical therapy, tolerated p.o. diet, and voided.  She was very motivated and pushing to be discharged home as quick as possible.    While in the process of being discharged home and getting up to the chair she had an episode of syncope.  This was brief and appeared to be a result of a vasovagal response versus orthostasis.  She received IV fluids, no focal weakness observed.  No recurrence of the episode.    Patient used an IS for atelectasis prophylaxis and aspirin along with mechanicals for DVT prophylaxis.    Home medications were resumed as appropriate, and labs were monitored and remained fairly stable.     With the progress she has made, Ms. Edmonds is ready for DC home today.      Patient will have an Arrow pump ( instructed on it during this admit).    Discussed with patient regarding plan and she shows understanding and agreement.    Patient will have HHPT following discharge.        Procedures Performed  Procedure(s):  TOTAL KNEE ARTHROPLASTY RIGHT       Pertinent Test Results:    I reviewed the patient's new clinical results.   Results from last 7 days   Lab Units 11/09/21  0749   WBC 10*3/mm3 10.65   HEMOGLOBIN g/dL 11.4*   HEMATOCRIT % 35.2   PLATELETS 10*3/mm3 231     Results from last 7 days   Lab Units 11/09/21  0749 11/08/21  0655   SODIUM mmol/L 140  --    POTASSIUM mmol/L 3.6 3.9   CHLORIDE mmol/L 107  --    CO2 mmol/L 25.0  --    BUN mg/dL 14  --    CREATININE mg/dL 0.71  --    CALCIUM mg/dL 8.0*  --    GLUCOSE mg/dL 103*  --      I reviewed the patient's new imaging including images and reports.  Results for JAVIER EDMONDS (MRN [id number]) as of 11/10/2021 07:07   Ref. Range 10/29/2021 09:11   Hemoglobin Latest Ref Range: 12.0 - 15.9 g/dL 13.2   Hematocrit Latest Ref Range: 34.0 - 46.6 % 40.1       Physical therapy  Plan of Care Reviewed With:  "patient  Progress: improving  Outcome Summary: Patient demonstrated safe mobility foe 250 feet with walker. Her pain limited her ROM 10-80 deg  R knee. She is going home with home health today.  Discharge Assessment:       Visit Vitals  /75 (BP Location: Left arm, Patient Position: Sitting)   Pulse 86   Temp 97.7 °F (36.5 °C) (Oral)   Resp 18   Ht 157.5 cm (62\")   Wt 83 kg (183 lb)   SpO2 98%   BMI 33.47 kg/m²     Temp (24hrs), Av.9 °F (36.6 °C), Min:97.7 °F (36.5 °C), Max:98.1 °F (36.7 °C)      General Appearance:    Alert, cooperative, in no acute distress   Lungs:     Clear to auscultation,respirations regular, even and                   unlabored    Heart:    Regular rhythm and normal rate, normal S1 and S2   Abdomen:     Normal bowel sounds, no masses, no organomegaly, soft        non-tender, non-distended, no guarding, no rebound                 tenderness   Extremities:   CDI dressing surgical knee, cheryl dressing system. ACB cath present, arrow pump.   Pulses:   Pulses palpable and equal bilaterally   Skin:   No bleeding, bruising or rash   Neurologic:   Cranial nerves 2 - 12 grossly intact, sensation intact, Flexion and dorsiflexion intact bilateral feet.         Discharge Disposition: Home          Discharge Medications      New Medications      Instructions Start Date   acetaminophen 500 MG tablet  Commonly known as: TYLENOL   1,000 mg, Oral, Every 8 Hours, Take every 8 hours  as needed after 1 week      aspirin  MG tablet   325 mg, Oral, Daily      docusate sodium 100 MG capsule  Commonly known as: COLACE   100 mg, Oral, 2 Times Daily      polyethylene glycol 17 g packet  Commonly known as: MIRALAX   17 g, Oral, Daily      traMADol 50 MG tablet  Commonly known as: ULTRAM   50 mg, Oral, Every 6 Hours PRN         Continue These Medications      Instructions Start Date   esomeprazole 40 MG capsule  Commonly known as: nexIUM   40 mg, Oral, Every Morning Before Breakfast      levothyroxine 75 " MCG tablet  Commonly known as: SYNTHROID, LEVOTHROID   75 mcg, Oral, Daily      losartan-hydrochlorothiazide 50-12.5 MG per tablet  Commonly known as: HYZAAR   1 tablet, Oral, Daily      meloxicam 15 MG tablet  Commonly known as: MOBIC   15 mg, Oral, Daily      propranolol 20 MG tablet  Commonly known as: INDERAL   20 mg, Oral, Daily, Pt states doctor decreased from BID to once daily.      rosuvastatin 5 MG tablet  Commonly known as: CRESTOR   5 mg, Oral, TWICE A WEEK ON Monday AND Tuesday.      VITAMIN D (ERGOCALCIFEROL) PO   50,000 mcg, Oral, Weekly, THURSDAYS         Stop These Medications    fluconazole 150 MG tablet  Commonly known as: DIFLUCAN            Discharge Diet: Resume prehospital diet    Activity at Discharge: Weightbearing as tolerated right lower extremity    Follow-up Appointments  Turner Lira MD per his orders    Discussed with patient and her .    Dragon disclaimer:  Part of this encounter note is an electronic transcription/translation of spoken language to printed text. The electronic translation of spoken language may permit erroneous, or at times, nonsensical words or phrases to be inadvertently transcribed; Although I have reviewed the note for such errors, some may still exist.       Mike Christensen MD  11/09/21  12:18 EST

## 2021-11-09 NOTE — PLAN OF CARE
Goal Outcome Evaluation:  Plan of Care Reviewed With: patient        Progress: improving  Outcome Summary: Patient demonstrated safe mobility foe 250 feet with walker. Her pain limited her ROM 10-80 deg  R knee. She is going home with home health today.

## 2021-11-09 NOTE — CASE MANAGEMENT/SOCIAL WORK
Case Management Discharge Note      Final Note: Arrangements complete for home. Her outpt PT appt has been changed to Wednesday 11/10 at 1pm. She is in agreement with plan         Selected Continued Care - Admitted Since 11/8/2021     Destination    No services have been selected for the patient.              Durable Medical Equipment    No services have been selected for the patient.              Dialysis/Infusion    No services have been selected for the patient.              Home Medical Care    No services have been selected for the patient.              Therapy Coordination complete.    Service Provider Selected Services Address Phone Fax Patient Preferred    [facility]  Outpatient Physical Therapy [address] [place] [address] [address] [phone] --          Community Resources    No services have been selected for the patient.              Community & DME    No services have been selected for the patient.                       Final Discharge Disposition Code: 01 - home or self-care

## 2021-11-09 NOTE — PROGRESS NOTES
"Stella Edmonds  [id number]  1955    /75 (BP Location: Left arm, Patient Position: Sitting)   Pulse 86   Temp 97.7 °F (36.5 °C) (Oral)   Resp 18   Ht 157.5 cm (62\")   Wt 83 kg (183 lb)   SpO2 98%   BMI 33.47 kg/m²     Lab Results (last 24 hours)     Procedure Component Value Units Date/Time    CBC & Differential [[id number]]  (Abnormal) Collected: 11/09/21 0749    Specimen: Blood Updated: 11/09/21 0915    Narrative:      The following orders were created for panel order CBC & Differential.  Procedure                               Abnormality         Status                     ---------                               -----------         ------                     CBC Auto Differential[[id number]]        Abnormal            Final result                 Please view results for these tests on the individual orders.    CBC Auto Differential [[id number]]  (Abnormal) Collected: 11/09/21 0749    Specimen: Blood Updated: 11/09/21 0915     WBC 10.65 10*3/mm3      RBC 3.69 10*6/mm3      Hemoglobin 11.4 g/dL      Hematocrit 35.2 %      MCV 95.4 fL      MCH 30.9 pg      MCHC 32.4 g/dL      RDW 13.0 %      RDW-SD 45.2 fl      MPV 10.4 fL      Platelets 231 10*3/mm3      Neutrophil % 75.8 %      Lymphocyte % 14.9 %      Monocyte % 8.7 %      Eosinophil % 0.1 %      Basophil % 0.1 %      Immature Grans % 0.4 %      Neutrophils, Absolute 8.07 10*3/mm3      Lymphocytes, Absolute 1.59 10*3/mm3      Monocytes, Absolute 0.93 10*3/mm3      Eosinophils, Absolute 0.01 10*3/mm3      Basophils, Absolute 0.01 10*3/mm3      Immature Grans, Absolute 0.04 10*3/mm3      nRBC 0.0 /100 WBC     Basic Metabolic Panel [[id number]] Collected: 11/09/21 0749    Specimen: Blood Updated: 11/09/21 0907    POC Glucose Once [[id number]]  (Abnormal) Collected: 11/08/21 1655    Specimen: Blood Updated: 11/08/21 1657     Glucose 181 mg/dL      Comment: Meter: [id number] : [id number] Don Byrd             Patient Care Team:  Ethan Blanco, " MD as PCP - General (Family Medicine)  Ethan Blanco MD as Referring Physician (Family Medicine)    SUBJECTIVE  Pain well controlled.  Excellent progress in physical therapy.  Stayed overnight due to reaction to narcotics    PHYSICAL EXAM  Incision clean and dry  Minimal thigh and leg swelling  Neurovascularly intact to knee and toes       S/P total knee arthroplasty, right    Hyperlipidemia    Hypertension    GERD (gastroesophageal reflux disease)    Arthritis of right knee      PLAN / DISPOSITION:  Hemoglobin stable no transfusion anticipated  Continue regional block  Discharge home today.    Turner Lira MD  11/09/21  09:31 EST

## 2021-11-10 LAB
BH BB BLOOD EXPIRATION DATE: NORMAL
BH BB BLOOD EXPIRATION DATE: NORMAL
BH BB BLOOD TYPE BARCODE: 5100
BH BB BLOOD TYPE BARCODE: 5100
BH BB DISPENSE STATUS: NORMAL
BH BB DISPENSE STATUS: NORMAL
BH BB PRODUCT CODE: NORMAL
BH BB PRODUCT CODE: NORMAL
BH BB UNIT NUMBER: NORMAL
BH BB UNIT NUMBER: NORMAL
CROSSMATCH INTERPRETATION: NORMAL
CROSSMATCH INTERPRETATION: NORMAL
UNIT  ABO: NORMAL
UNIT  ABO: NORMAL
UNIT  RH: NORMAL
UNIT  RH: NORMAL

## 2021-11-10 NOTE — PROGRESS NOTES
NATHALIE Mays    Nerve Cath Post Op Call    Patient Name: Stella Edmonds  :  1955  MRN:  [id number]  Date of Discharge: 2021    Nerve Cath Post Op Call:    Analgesia:Good  Side Effects:None  Catheter Site:clean  Patient Controlled ON Q pump infusion rate: 10ml/hr  Catheter Plan:Will continue with plan at home without changes and The patient was instructed to call ON CALL Anesthesia provider for any questions or problems  Patient/Family instructed to call ON CALL anesthesia provider for any questions or problems.  Patient Follow Up:

## 2021-11-11 ENCOUNTER — TRANSCRIBE ORDERS (OUTPATIENT)
Dept: ADMINISTRATIVE | Facility: HOSPITAL | Age: 66
End: 2021-11-11

## 2021-11-11 DIAGNOSIS — R92.8 ABNORMAL MAMMOGRAM: Primary | ICD-10-CM

## 2021-11-11 NOTE — PROGRESS NOTES
NATHALIE Mays    Nerve Cath Post Op Call    Patient Name: Stella Edmonds  :  1955  MRN:  [id number]  Date of Discharge: 2021    Nerve Cath Post Op Call:    Analgesia:Good  Side Effects:None  Catheter Site:clean  Patient Controlled ON Q pump infusion rate: 10ml/hr  Catheter Plan:The patient was instructed to call ON CALL Anesthesia provider for any questions or problems and Patient/Family member report nerve catheter previously discontinued, tip intact  Patient/Family instructed to call ON CALL anesthesia provider for any questions or problems.  Patient Follow Up:

## 2021-11-15 ENCOUNTER — TRANSCRIBE ORDERS (OUTPATIENT)
Dept: ADMINISTRATIVE | Facility: HOSPITAL | Age: 66
End: 2021-11-15

## 2021-11-15 DIAGNOSIS — R92.8 ABNORMAL MAMMOGRAM: Primary | ICD-10-CM

## 2021-12-27 ENCOUNTER — HOSPITAL ENCOUNTER (OUTPATIENT)
Dept: HOSPITAL 22 - COVID.OUT | Age: 66
Discharge: HOME | End: 2021-12-27
Payer: COMMERCIAL

## 2021-12-27 VITALS
TEMPERATURE: 97.52 F | RESPIRATION RATE: 18 BRPM | HEART RATE: 85 BPM | SYSTOLIC BLOOD PRESSURE: 127 MMHG | DIASTOLIC BLOOD PRESSURE: 80 MMHG | OXYGEN SATURATION: 97 %

## 2021-12-27 VITALS
HEART RATE: 73 BPM | RESPIRATION RATE: 16 BRPM | OXYGEN SATURATION: 98 % | SYSTOLIC BLOOD PRESSURE: 129 MMHG | TEMPERATURE: 97.52 F | DIASTOLIC BLOOD PRESSURE: 82 MMHG

## 2021-12-27 VITALS
DIASTOLIC BLOOD PRESSURE: 87 MMHG | TEMPERATURE: 97.5 F | HEART RATE: 81 BPM | OXYGEN SATURATION: 98 % | RESPIRATION RATE: 18 BRPM | SYSTOLIC BLOOD PRESSURE: 118 MMHG

## 2021-12-27 VITALS
RESPIRATION RATE: 20 BRPM | HEART RATE: 85 BPM | OXYGEN SATURATION: 94 % | DIASTOLIC BLOOD PRESSURE: 90 MMHG | SYSTOLIC BLOOD PRESSURE: 120 MMHG | TEMPERATURE: 97.5 F

## 2021-12-27 VITALS
SYSTOLIC BLOOD PRESSURE: 125 MMHG | OXYGEN SATURATION: 97 % | RESPIRATION RATE: 18 BRPM | TEMPERATURE: 97.52 F | DIASTOLIC BLOOD PRESSURE: 85 MMHG | HEART RATE: 77 BPM

## 2021-12-27 VITALS
DIASTOLIC BLOOD PRESSURE: 90 MMHG | OXYGEN SATURATION: 94 % | TEMPERATURE: 97.52 F | HEART RATE: 76 BPM | SYSTOLIC BLOOD PRESSURE: 118 MMHG | RESPIRATION RATE: 20 BRPM

## 2021-12-27 VITALS
HEART RATE: 76 BPM | TEMPERATURE: 97.5 F | SYSTOLIC BLOOD PRESSURE: 115 MMHG | OXYGEN SATURATION: 95 % | DIASTOLIC BLOOD PRESSURE: 89 MMHG | RESPIRATION RATE: 18 BRPM

## 2021-12-27 VITALS
OXYGEN SATURATION: 96 % | HEART RATE: 79 BPM | TEMPERATURE: 97.52 F | RESPIRATION RATE: 18 BRPM | DIASTOLIC BLOOD PRESSURE: 78 MMHG | SYSTOLIC BLOOD PRESSURE: 122 MMHG

## 2021-12-27 DIAGNOSIS — Z23: ICD-10-CM

## 2021-12-27 DIAGNOSIS — U07.1: Primary | ICD-10-CM

## 2021-12-27 PROCEDURE — 96365 THER/PROPH/DIAG IV INF INIT: CPT

## 2022-01-12 ENCOUNTER — HOSPITAL ENCOUNTER (OUTPATIENT)
Age: 67
End: 2022-01-12
Payer: COMMERCIAL

## 2022-01-12 DIAGNOSIS — M25.551: Primary | ICD-10-CM

## 2022-01-12 PROCEDURE — 73502 X-RAY EXAM HIP UNI 2-3 VIEWS: CPT

## 2022-03-31 ENCOUNTER — HOSPITAL ENCOUNTER (OUTPATIENT)
Dept: MAMMOGRAPHY | Facility: HOSPITAL | Age: 67
Discharge: HOME | End: 2022-03-31
Admitting: FAMILY MEDICINE

## 2022-03-31 DIAGNOSIS — R92.8 ABNORMAL MAMMOGRAM: ICD-10-CM

## 2022-03-31 PROCEDURE — 77062 BREAST TOMOSYNTHESIS BI: CPT | Performed by: RADIOLOGY

## 2022-03-31 PROCEDURE — 77066 DX MAMMO INCL CAD BI: CPT | Performed by: RADIOLOGY

## 2022-03-31 PROCEDURE — G0279 TOMOSYNTHESIS, MAMMO: HCPCS

## 2022-03-31 PROCEDURE — 77066 DX MAMMO INCL CAD BI: CPT

## 2022-06-09 ENCOUNTER — APPOINTMENT (OUTPATIENT)
Dept: MAMMOGRAPHY | Facility: HOSPITAL | Age: 67
End: 2022-06-09

## 2022-06-09 ENCOUNTER — APPOINTMENT (OUTPATIENT)
Dept: ULTRASOUND IMAGING | Facility: HOSPITAL | Age: 67
End: 2022-06-09

## 2023-01-09 ENCOUNTER — TRANSCRIBE ORDERS (OUTPATIENT)
Dept: ADMINISTRATIVE | Facility: HOSPITAL | Age: 68
End: 2023-01-09
Payer: MEDICARE

## 2023-01-09 DIAGNOSIS — Z12.31 VISIT FOR SCREENING MAMMOGRAM: ICD-10-CM

## 2023-01-09 DIAGNOSIS — R92.8 ABNORMAL MAMMOGRAM: Primary | ICD-10-CM

## 2023-02-07 ENCOUNTER — OFFICE VISIT (OUTPATIENT)
Dept: GASTROENTEROLOGY | Facility: CLINIC | Age: 68
End: 2023-02-07
Payer: MEDICARE

## 2023-02-07 ENCOUNTER — LAB (OUTPATIENT)
Dept: LAB | Facility: HOSPITAL | Age: 68
End: 2023-02-07
Payer: MEDICARE

## 2023-02-07 VITALS
DIASTOLIC BLOOD PRESSURE: 80 MMHG | HEIGHT: 62 IN | BODY MASS INDEX: 32.42 KG/M2 | HEART RATE: 66 BPM | SYSTOLIC BLOOD PRESSURE: 128 MMHG | TEMPERATURE: 98 F | OXYGEN SATURATION: 98 % | WEIGHT: 176.2 LBS

## 2023-02-07 DIAGNOSIS — R10.11 RUQ PAIN: Primary | ICD-10-CM

## 2023-02-07 DIAGNOSIS — R10.11 RUQ PAIN: ICD-10-CM

## 2023-02-07 DIAGNOSIS — K80.50 BILIARY COLIC SYMPTOM: ICD-10-CM

## 2023-02-07 PROCEDURE — 99213 OFFICE O/P EST LOW 20 MIN: CPT | Performed by: NURSE PRACTITIONER

## 2023-02-07 RX ORDER — PANTOPRAZOLE SODIUM 40 MG/1
TABLET, DELAYED RELEASE ORAL
COMMUNITY
Start: 2023-01-16

## 2023-02-07 NOTE — PROGRESS NOTES
New Patient Consultation     Patient Name: Stella Edmonds  : 1955   MRN: [id number]     Chief Complaint:    Chief Complaint   Patient presents with   • Abdominal Pain   • Diarrhea       History of Present Illness: Stella Edmonds is a 67 y.o. female who is here today for a Gastroenterology Consultation for RUQ pain    Stella has been having RUQ pain for the past two months.  The pain occurs a few times a week. The pain will persistent for weeks. The pain occurs with certain foods like lettuce or broccoli.  The pain is dull or achy in nature. Her heartburn is controlled. No nausea or vomiting. She has lost 10 lbs intentionally.  Her appetite is unchanged.  She has had this issue in the past and it resolved with a GI soft diet for about 1 weeks.  She tried this diet again and it has not resolved the issue.      She is having normal Bms- she did have diarrhea 2 months ago.    Had an US in 2023 with normal gallbladder    ENDOSCOPY, INT (2021)-hiatal hernia  ENDOSCOPY, INT (2020)-hiatal hernia, mild gastritis  ENDOSCOPY, INT (2018)-hiatal hernia, gastritis    She had a colonoscopy with Dr. Jeffers in 2016 with diverticulosis and a 10-year recall  Subjective      Review of Systems:   Review of Systems   Constitutional: Negative for appetite change and unexpected weight loss.   HENT: Negative for trouble swallowing.    Gastrointestinal: Positive for abdominal pain and diarrhea. Negative for abdominal distention, anal bleeding, blood in stool, constipation, nausea, rectal pain, vomiting, GERD and indigestion.       Past Medical History:   Past Medical History:   Diagnosis Date   • Abnormal EKG     History of abnormal EKG: Myocardial perfusion study, 2009:  Reduced exercise tolerance, no evidence of inducible ischemia, normal LV systolic function.    • Abnormal leg movement     Probable restless leg syndrome.   • Asthma    • AVM (arteriovenous malformation)     Frontal AV  malformation with removal by Dr. Sarmiento, .    • Back pain    • Esophageal dysmotility    • Fatigue      She reports that she is feeling less fatigued off her diuretic. Lightheadedness and dizziness; improved.   • Gastritis    • GERD (gastroesophageal reflux disease)    • Globus sensation    •  1 para 1    • Hiatal hernia    • History of hepatitis A     Age 9   • Hot flashes    • Hyperlipidemia    • Hypertension    • Hypothyroidism    • Lower extremity edema     Mild.She did state that she does have some increase in her lower extremity edema, and Dr. Willett discussed with her that she may take hydrochlorothiazide 12.5 or half a tablet on an as needed basis.   • Migraines     Migraines, treated with propranolol.   • Obesity (BMI 30.0-34.9)     BMI 33.5   • PONV (postoperative nausea and vomiting)    • Reflux esophagitis    • Schatzki's ring    • Seizure (HCC)     last seizure - 2 years ago   • UTI (urinary tract infection)     History of UTIs   • Varicose vein     Varicosities   • Wears eyeglasses        Past Surgical History:   Past Surgical History:   Procedure Laterality Date   • BREAST BIOPSY Right 07/10/2017    stereo bx   • BREAST BIOPSY Left 2018    stereo bx   • BREAST BIOPSY Right 2019    stereo   • COLONOSCOPY      2017   • ESOPHAGEAL DILATATION     • HYSTERECTOMY      total    • LUMBAR DISCECTOMY Left 10/4/2017    Procedure: LUMBAR DISCECTOMY MICRO ENDOSCOPIC L5-S1 RIGHT;  Surgeon: Pancho Sarmiento MD;  Location: [facility];  Service:    • OOPHORECTOMY     • OTHER SURGICAL HISTORY  2015    Prior right frontal craniotomy for Spetzler-Chucky grade I arteriovenous malformation.-DR. SARMIENTO/DR. BUNDY   • OTHER SURGICAL HISTORY      Frontal AV malformation with removal, Dr. Sarmiento, .   • RENAL ARTERY STENT      pt denies   • TOTAL KNEE ARTHROPLASTY Right 2021    Procedure: TOTAL KNEE ARTHROPLASTY RIGHT;  Surgeon: Turner Lira MD;  Location: [facility] OR;   Service: Orthopedics;  Laterality: Right;   • UPPER GASTROINTESTINAL ENDOSCOPY  11/02/2005       Family History:   Family History   Problem Relation Age of Onset   • Breast cancer Mother 34   • Arthritis Mother    • Cervical cancer Other    • Ovarian cancer Paternal Grandmother 82   • Arthritis Father        Social History:   Social History     Socioeconomic History   • Marital status:    Tobacco Use   • Smoking status: Never   • Smokeless tobacco: Never   Vaping Use   • Vaping Use: Never used   Substance and Sexual Activity   • Alcohol use: No   • Drug use: No   • Sexual activity: Never       Alcohol/Tobacco History:   Social History     Substance and Sexual Activity   Alcohol Use No     Social History     Tobacco Use   Smoking Status Never   Smokeless Tobacco Never       Medications:     Current Outpatient Medications:   •  levothyroxine (SYNTHROID, LEVOTHROID) 75 MCG tablet, Take 75 mcg by mouth daily., Disp: , Rfl:   •  losartan-hydrochlorothiazide (HYZAAR) 50-12.5 MG per tablet, Take 1 tablet by mouth Daily., Disp: , Rfl:   •  meloxicam (MOBIC) 15 MG tablet, Take 15 mg by mouth Daily., Disp: , Rfl:   •  pantoprazole (PROTONIX) 40 MG EC tablet, , Disp: , Rfl:   •  propranolol (INDERAL) 20 MG tablet, Take 20 mg by mouth Daily. Pt states doctor decreased from BID to once daily., Disp: , Rfl:   •  rosuvastatin (CRESTOR) 5 MG tablet, Take 5 mg by mouth. TWICE A WEEK ON Monday AND Tuesday., Disp: , Rfl:   •  traMADol (ULTRAM) 50 MG tablet, Take 1 tablet by mouth Every 6 (Six) Hours As Needed for Moderate Pain ., Disp: 40 tablet, Rfl: 0  •  VITAMIN D, ERGOCALCIFEROL, PO, Take 50,000 mcg by mouth 1 (One) Time Per Week. THURSDAYS, Disp: , Rfl:     Allergies:   Allergies   Allergen Reactions   • Orange Fruit [Citrus] Hives and Nausea And Vomiting     vomiting   • Other Other (See Comments)     History of possible seizures, patient felt related to orange dye.   Most pain meds lead to GI upset/vomiting.  Orange Dye  "  • Codeine      n/v   • Lortab [Hydrocodone-Acetaminophen]      n/v   • Morphine And Related      Passed out   • Sulfa Antibiotics Unknown - Low Severity     Patient states allergy to sulfa       Objective     Physical Exam:  Vital Signs:   Vitals:    02/07/23 1009   BP: 128/80   BP Location: Left arm   Patient Position: Sitting   Cuff Size: Adult   Pulse: 66   Temp: 98 °F (36.7 °C)   TempSrc: Temporal   SpO2: 98%   Weight: 79.9 kg (176 lb 3.2 oz)   Height: 157.5 cm (62\")     Body mass index is 32.23 kg/m².     Physical Exam  Vitals and nursing note reviewed.   Constitutional:       General: She is not in acute distress.     Appearance: She is well-developed. She is not diaphoretic.   Eyes:      General: No scleral icterus.     Conjunctiva/sclera: Conjunctivae normal.   Neck:      Thyroid: No thyromegaly.   Cardiovascular:      Rate and Rhythm: Normal rate and regular rhythm.   Pulmonary:      Effort: Pulmonary effort is normal.      Breath sounds: Normal breath sounds.   Abdominal:      General: Bowel sounds are normal. There is no distension.      Palpations: Abdomen is soft.      Tenderness: There is abdominal tenderness. There is guarding. There is no rebound. Positive signs include Davenport's sign.      Hernia: No hernia is present.   Musculoskeletal:      Cervical back: Neck supple.      Right lower leg: No edema.      Left lower leg: No edema.   Skin:     General: Skin is warm and dry.      Capillary Refill: Capillary refill takes 2 to 3 seconds.      Coloration: Skin is not jaundiced or pale.      Findings: No bruising or petechiae.      Nails: There is no clubbing.   Neurological:      Mental Status: She is alert and oriented to person, place, and time.   Psychiatric:         Behavior: Behavior normal.         Thought Content: Thought content normal.         Judgment: Judgment normal.         Assessment / Plan      Assessment/Plan:   Diagnoses and all orders for this visit:    1. RUQ pain (Primary)  -     NM " HIDA SCAN WITH PHARMACOLOGICAL INTERVENTION; Future  -     CBC & Differential; Future  -     Comprehensive Metabolic Panel; Future  -     Ambulatory Referral to General Surgery    2. Biliary colic symptom  -     NM HIDA SCAN WITH PHARMACOLOGICAL INTERVENTION; Future    She has symptoms of biliary colic and a positive Davenport sign.  Unfortunately, it appears there is not an opening for HIDA scan for another 8 weeks.  I would like to go ahead and start her general surgery referral.  We discussed with worsening pain or pain with fever to present to ED.  Recommend low-fat diet    Follow Up:   Return if symptoms worsen or fail to improve.    Plan of care reviewed with the patient at the conclusion of today's visit.  Education was provided regarding diagnosis, management, and any prescribed or recommended OTC medications.  Patient verbalized understanding of and agreement with management plan.       ZOHAIB Estevez  [facility]

## 2023-03-31 ENCOUNTER — HOSPITAL ENCOUNTER (OUTPATIENT)
Dept: MAMMOGRAPHY | Facility: HOSPITAL | Age: 68
Discharge: HOME | End: 2023-03-31
Admitting: FAMILY MEDICINE
Payer: MEDICARE

## 2023-03-31 DIAGNOSIS — Z12.31 VISIT FOR SCREENING MAMMOGRAM: ICD-10-CM

## 2023-03-31 PROCEDURE — 77067 SCR MAMMO BI INCL CAD: CPT | Performed by: RADIOLOGY

## 2023-03-31 PROCEDURE — 77063 BREAST TOMOSYNTHESIS BI: CPT

## 2023-03-31 PROCEDURE — 77063 BREAST TOMOSYNTHESIS BI: CPT | Performed by: RADIOLOGY

## 2023-03-31 PROCEDURE — 77067 SCR MAMMO BI INCL CAD: CPT

## 2023-04-11 ENCOUNTER — TELEPHONE (OUTPATIENT)
Dept: GASTROENTEROLOGY | Facility: CLINIC | Age: 68
End: 2023-04-11
Payer: MEDICARE

## 2023-04-11 NOTE — TELEPHONE ENCOUNTER
Returned patients voicemail regarding when she is due for a colonoscopy. No voicemail left, as voicemail box was not set up.

## 2023-04-14 ENCOUNTER — HOSPITAL ENCOUNTER (OUTPATIENT)
Dept: NUCLEAR MEDICINE | Facility: HOSPITAL | Age: 68
Discharge: HOME | End: 2023-04-14
Payer: MEDICARE

## 2023-04-14 VITALS — BODY MASS INDEX: 31.46 KG/M2 | WEIGHT: 172 LBS

## 2023-04-14 DIAGNOSIS — R10.11 RUQ PAIN: ICD-10-CM

## 2023-04-14 DIAGNOSIS — K80.50 BILIARY COLIC SYMPTOM: ICD-10-CM

## 2023-04-14 PROCEDURE — 0 TECHNETIUM TC 99M MEBROFENIN KIT: Performed by: NURSE PRACTITIONER

## 2023-04-14 PROCEDURE — 78227 HEPATOBIL SYST IMAGE W/DRUG: CPT

## 2023-04-14 PROCEDURE — A9537 TC99M MEBROFENIN: HCPCS | Performed by: NURSE PRACTITIONER

## 2023-04-14 PROCEDURE — 25010000002 SINCALIDE PER 5 MCG: Performed by: NURSE PRACTITIONER

## 2023-04-14 RX ORDER — KIT FOR THE PREPARATION OF TECHNETIUM TC 99M MEBROFENIN 45 MG/10ML
1 INJECTION, POWDER, LYOPHILIZED, FOR SOLUTION INTRAVENOUS
Status: COMPLETED | OUTPATIENT
Start: 2023-04-14 | End: 2023-04-14

## 2023-04-14 RX ADMIN — MEBROFENIN 1 DOSE: 45 INJECTION, POWDER, LYOPHILIZED, FOR SOLUTION INTRAVENOUS at 12:15

## 2023-04-14 RX ADMIN — SINCALIDE 1.6 MCG: 5 INJECTION, POWDER, LYOPHILIZED, FOR SOLUTION INTRAVENOUS at 13:40

## 2023-04-17 DIAGNOSIS — R10.11 RUQ PAIN: Primary | ICD-10-CM

## 2023-04-17 RX ORDER — HYOSCYAMINE SULFATE 0.125 MG
0.12 TABLET ORAL
Qty: 120 TABLET | Refills: 5 | Status: SHIPPED | OUTPATIENT
Start: 2023-04-17

## 2023-04-17 RX ORDER — PANTOPRAZOLE SODIUM 40 MG/1
40 TABLET, DELAYED RELEASE ORAL 2 TIMES DAILY
Qty: 60 TABLET | Refills: 2 | Status: SHIPPED | OUTPATIENT
Start: 2023-04-17

## 2023-04-18 DIAGNOSIS — R10.11 RUQ PAIN: Primary | ICD-10-CM

## 2023-05-31 ENCOUNTER — HOSPITAL ENCOUNTER (OUTPATIENT)
Dept: CT IMAGING | Facility: HOSPITAL | Age: 68
Discharge: HOME | End: 2023-05-31
Admitting: NURSE PRACTITIONER

## 2023-05-31 DIAGNOSIS — R10.11 RUQ PAIN: ICD-10-CM

## 2023-05-31 LAB — CREAT BLDA-MCNC: 0.9 MG/DL (ref 0.6–1.3)

## 2023-05-31 PROCEDURE — 74170 CT ABD WO CNTRST FLWD CNTRST: CPT

## 2023-05-31 PROCEDURE — 82565 ASSAY OF CREATININE: CPT

## 2023-05-31 PROCEDURE — 25510000001 IOPAMIDOL 61 % SOLUTION: Performed by: NURSE PRACTITIONER

## 2023-05-31 RX ADMIN — IOPAMIDOL 85 ML: 612 INJECTION, SOLUTION INTRAVENOUS at 09:12

## 2023-06-01 ENCOUNTER — TELEPHONE (OUTPATIENT)
Dept: GASTROENTEROLOGY | Facility: CLINIC | Age: 68
End: 2023-06-01

## 2023-06-01 DIAGNOSIS — R10.11 RUQ PAIN: ICD-10-CM

## 2023-06-01 RX ORDER — HYOSCYAMINE SULFATE 0.125 MG
0.12 TABLET ORAL
Qty: 120 TABLET | Refills: 5 | Status: SHIPPED | OUTPATIENT
Start: 2023-06-01

## 2023-06-01 NOTE — TELEPHONE ENCOUNTER
Discussed CT scan results.  She has not received the anaspaz. Will call this in again. Rec low fat diet- symptoms reproduced on HIDA- she does not want to have any unnecessary surgeries

## 2023-09-20 ENCOUNTER — TRANSCRIBE ORDERS (OUTPATIENT)
Dept: ADMINISTRATIVE | Facility: HOSPITAL | Age: 68
End: 2023-09-20
Payer: MEDICARE

## 2023-09-20 DIAGNOSIS — R92.8 ABNORMAL MAMMOGRAM: Primary | ICD-10-CM

## 2024-02-27 ENCOUNTER — TELEPHONE (OUTPATIENT)
Dept: OBSTETRICS AND GYNECOLOGY | Facility: CLINIC | Age: 69
End: 2024-02-27
Payer: MEDICARE

## 2024-02-27 NOTE — TELEPHONE ENCOUNTER
UNABLE TO WARM TRANSFER    [phone]    PT WAS PT OF DR RACHELE FERRELL     PT IS WANTING TO CONFIRM SHE HAD TOTAL HYSTERECTOMY IN 1997    PLEASE ADVISE

## 2024-02-27 NOTE — TELEPHONE ENCOUNTER
Patient informed Dr Cassidy has retired and he was not in this office. Medical records number given to patient.

## 2024-04-02 ENCOUNTER — HOSPITAL ENCOUNTER (OUTPATIENT)
Age: 69
End: 2024-04-02
Payer: MEDICARE

## 2024-04-02 DIAGNOSIS — R09.89: ICD-10-CM

## 2024-04-02 DIAGNOSIS — R05.9: Primary | ICD-10-CM

## 2024-04-02 DIAGNOSIS — Z79.899: ICD-10-CM

## 2024-04-02 PROCEDURE — 71046 X-RAY EXAM CHEST 2 VIEWS: CPT

## 2024-04-02 PROCEDURE — 87205 SMEAR GRAM STAIN: CPT

## 2024-04-02 PROCEDURE — 87070 CULTURE OTHR SPECIMN AEROBIC: CPT

## 2024-04-03 ENCOUNTER — HOSPITAL ENCOUNTER (OUTPATIENT)
Dept: MAMMOGRAPHY | Facility: HOSPITAL | Age: 69
Discharge: HOME | End: 2024-04-03
Admitting: FAMILY MEDICINE
Payer: MEDICARE

## 2024-04-03 ENCOUNTER — TELEPHONE (OUTPATIENT)
Dept: MAMMOGRAPHY | Facility: HOSPITAL | Age: 69
End: 2024-04-03
Payer: MEDICARE

## 2024-04-03 DIAGNOSIS — R92.8 ABNORMAL MAMMOGRAM: ICD-10-CM

## 2024-04-03 PROCEDURE — 77063 BREAST TOMOSYNTHESIS BI: CPT

## 2024-04-03 PROCEDURE — 77067 SCR MAMMO BI INCL CAD: CPT

## 2024-04-03 NOTE — TELEPHONE ENCOUNTER
Patient called concerned as to why we didn't do the diagnostic mammogram ordered by her provider.  It was explained to the patient that the diagnostic mammogram was not supported or indicated and that a screening mammogram had been performed.  The diagnosis code for the diagnostic mammogram that had been ordered was incorrect.  The patient reported no new problems and the appropriate study was completed today-a screening mammogram.

## 2025-01-29 ENCOUNTER — TRANSCRIBE ORDERS (OUTPATIENT)
Dept: ADMINISTRATIVE | Facility: HOSPITAL | Age: 70
End: 2025-01-29
Payer: MEDICARE

## 2025-01-29 DIAGNOSIS — Z12.31 SCREENING MAMMOGRAM FOR BREAST CANCER: Primary | ICD-10-CM

## 2025-03-27 LAB
NCCN CRITERIA FLAG: ABNORMAL
TYRER CUZICK SCORE: 7.5

## 2025-03-28 ENCOUNTER — RESULTS FOLLOW-UP (OUTPATIENT)
Facility: HOSPITAL | Age: 70
End: 2025-03-28
Payer: MEDICARE

## 2025-03-28 ENCOUNTER — DOCUMENTATION (OUTPATIENT)
Dept: GENETICS | Facility: HOSPITAL | Age: 70
End: 2025-03-28
Payer: MEDICARE

## 2025-03-28 NOTE — PROGRESS NOTES
This patient recently completed the CARE risk assessment for a mammogram appointment. Based on the patient's responses, NCCN criteria for genetic testing was met. At the time of the assessment, the patient was provided with both written and video educational materials regarding genetic testing.    Navigator follow-up:   The patient underwent genetic counseling and genetic testing in 2018. The BreastNext panel was ordered through [facility] which analyzes 17 genes associated with cancer risk. No additional testing is indicated at this time.

## 2025-04-08 ENCOUNTER — HOSPITAL ENCOUNTER (OUTPATIENT)
Dept: MAMMOGRAPHY | Facility: HOSPITAL | Age: 70
Discharge: HOME | End: 2025-04-08
Admitting: FAMILY MEDICINE
Payer: MEDICARE

## 2025-04-08 DIAGNOSIS — Z12.31 SCREENING MAMMOGRAM FOR BREAST CANCER: ICD-10-CM

## 2025-04-08 PROCEDURE — 77063 BREAST TOMOSYNTHESIS BI: CPT

## 2025-04-08 PROCEDURE — 77063 BREAST TOMOSYNTHESIS BI: CPT | Performed by: RADIOLOGY

## 2025-04-08 PROCEDURE — 77067 SCR MAMMO BI INCL CAD: CPT | Performed by: RADIOLOGY

## 2025-04-08 PROCEDURE — 77067 SCR MAMMO BI INCL CAD: CPT

## (undated) DEVICE — NEEDLE, QUINCKE 22GX3.5": Brand: MEDLINE INDUSTRIES, INC.

## (undated) DEVICE — SUT VIC 0 UR6 27IN VCP603H

## (undated) DEVICE — MEDI-VAC YANKAUER SUCTION HANDLE W/BULBOUS TIP: Brand: CARDINAL HEALTH

## (undated) DEVICE — SNAP KOVER: Brand: UNBRANDED

## (undated) DEVICE — SYR LUERLOK 50ML

## (undated) DEVICE — DRSNG WND GZ PAD BORDERED 4X8IN STRL

## (undated) DEVICE — ANTIBACTERIAL UNDYED BRAIDED (POLYGLACTIN 910), SYNTHETIC ABSORBABLE SUTURE: Brand: COATED VICRYL

## (undated) DEVICE — ENCORE® LATEX MICRO SIZE 8, STERILE LATEX POWDER-FREE SURGICAL GLOVE: Brand: ENCORE

## (undated) DEVICE — TRY EPID SFTY 18G 3.5IN 1T7680

## (undated) DEVICE — GLV SURG BIOGEL LTX PF 8

## (undated) DEVICE — TRAP FLD MINIVAC MEGADYNE 100ML

## (undated) DEVICE — STRYKER PERFORMANCE SERIES SAGITTAL BLADE: Brand: STRYKER PERFORMANCE SERIES

## (undated) DEVICE — NDL HYPO ECLPS SFTY 25G 1 1/2IN

## (undated) DEVICE — NDL SPINE 22G 31/2IN BLK

## (undated) DEVICE — PAD GRND REM POLYHESIVE A/ DISP

## (undated) DEVICE — APPL CHLORAPREP W/TINT 26ML BLU

## (undated) DEVICE — ADHS LIQ MASTISOL 2/3ML

## (undated) DEVICE — PATIENT RETURN ELECTRODE, SINGLE-USE, CONTACT QUALITY MONITORING, ADULT, WITH 9FT CORD, FOR PATIENTS WEIGING OVER 33LBS. (15KG): Brand: MEGADYNE

## (undated) DEVICE — SOL LR 1000ML

## (undated) DEVICE — MEDI-VAC NON-CONDUCTIVE SUCTION TUBING: Brand: CARDINAL HEALTH

## (undated) DEVICE — TOOL T12MH25M LEGEND 12CM 2.5MM MH 2FLT: Brand: MIDAS REX ™

## (undated) DEVICE — COVER,LIGHT HANDLE,FLX,1/PK: Brand: MEDLINE INDUSTRIES, INC.

## (undated) DEVICE — PK NEURO DISC 10

## (undated) DEVICE — TBG PENCL TELESCP MEGADYNE SMOKE EVAC 10FT

## (undated) DEVICE — PAD ARMBRD SURG CONVOL 7.5X20X2IN

## (undated) DEVICE — RUBBERBAND LF STRL PK/2

## (undated) DEVICE — STERILE PVP: Brand: MEDLINE INDUSTRIES, INC.

## (undated) DEVICE — C-ARM: Brand: UNBRANDED

## (undated) DEVICE — GLV SURG SIGNATURE TOUCH PF LTX 8 STRL BX/50

## (undated) DEVICE — TUBING, SUCTION, 1/4" X 10', STRAIGHT: Brand: MEDLINE

## (undated) DEVICE — ELECTRD BLD EXT EDGE/INSUL 6IN

## (undated) DEVICE — BLANKT WARM UPPR/BDY ARM/OUT 57X196CM

## (undated) DEVICE — AIRWY 90MM NO9

## (undated) DEVICE — COVADERM: Brand: DEROYAL

## (undated) DEVICE — PUMP PAIN AUTOFUSER AUTO SELCT NOBOLUS 1TO14ML/HR 550ML DISP

## (undated) DEVICE — SYR CONTRL LUERLOK 10CC

## (undated) DEVICE — CANNULA,ADULT,SOFT-TOUCH,7TUBE,SC: Brand: MEDLINE

## (undated) DEVICE — 3M™ STERI-STRIP™ REINFORCED ADHESIVE SKIN CLOSURES, R1547, 1/2 IN X 4 IN (12 MM X 100 MM), 6 STRIPS/ENVELOPE: Brand: 3M™ STERI-STRIP™

## (undated) DEVICE — PK KN TOTL 10

## (undated) DEVICE — DRP MICROSCP LEICA W/GLASS LENS

## (undated) DEVICE — DRP MICROSCOP ELIMINATES GLAS COVR

## (undated) DEVICE — MAGNETIC DRAPE: Brand: DEVON

## (undated) DEVICE — GLV SURG PREMIERPRO MIC LTX PF SZ8.5 BRN

## (undated) DEVICE — 3M™ STERI-STRIP™ ANTIMICROBIAL SKIN CLOSURES 1/2 INCH X 4 INCHES 50/CARTON 4 CARTONS/CASE A1847: Brand: 3M™ STERI-STRIP™

## (undated) DEVICE — NDL SPINE 20G 3 1/2 YEL STRL 1P/U

## (undated) DEVICE — 3 BONE CEMENT MIXER: Brand: MIXEVAC

## (undated) DEVICE — 3M™ STERI-DRAPE™ INSTRUMENT POUCH 1018: Brand: STERI-DRAPE™

## (undated) DEVICE — HDRST INTUB GENTLETOUCH SLOT 7IN RT

## (undated) DEVICE — ULTRACLEAN ACCESSORY ELECTRODE 6" (15.24 CM) COATED BLADE: Brand: ULTRACLEAN

## (undated) DEVICE — SPNG GZ WOVN 4X4IN 12PLY 10/BX STRL